# Patient Record
Sex: FEMALE | Race: WHITE | NOT HISPANIC OR LATINO | Employment: FULL TIME | URBAN - METROPOLITAN AREA
[De-identification: names, ages, dates, MRNs, and addresses within clinical notes are randomized per-mention and may not be internally consistent; named-entity substitution may affect disease eponyms.]

---

## 2019-09-02 ENCOUNTER — APPOINTMENT (OUTPATIENT)
Dept: CT IMAGING | Facility: CLINIC | Age: 54
End: 2019-09-02
Attending: EMERGENCY MEDICINE
Payer: COMMERCIAL

## 2019-09-02 ENCOUNTER — HOSPITAL ENCOUNTER (EMERGENCY)
Facility: CLINIC | Age: 54
Discharge: HOME | End: 2019-09-02
Attending: EMERGENCY MEDICINE | Admitting: EMERGENCY MEDICINE
Payer: COMMERCIAL

## 2019-09-02 VITALS
WEIGHT: 170 LBS | BODY MASS INDEX: 31.28 KG/M2 | HEIGHT: 62 IN | TEMPERATURE: 99.2 F | DIASTOLIC BLOOD PRESSURE: 94 MMHG | OXYGEN SATURATION: 98 % | SYSTOLIC BLOOD PRESSURE: 136 MMHG | HEART RATE: 75 BPM | RESPIRATION RATE: 20 BRPM

## 2019-09-02 DIAGNOSIS — R10.9 FLANK PAIN: ICD-10-CM

## 2019-09-02 DIAGNOSIS — T83.122A: ICD-10-CM

## 2019-09-02 LAB
ALBUMIN SERPL-MCNC: 3.4 G/DL (ref 3.4–5)
ALBUMIN UR-MCNC: 30 MG/DL
ALP SERPL-CCNC: 62 U/L (ref 40–150)
ALT SERPL W P-5'-P-CCNC: 19 U/L (ref 0–50)
ANION GAP SERPL CALCULATED.3IONS-SCNC: 7 MMOL/L (ref 3–14)
APPEARANCE UR: ABNORMAL
AST SERPL W P-5'-P-CCNC: 20 U/L (ref 0–45)
BASOPHILS # BLD AUTO: 0.1 10E9/L (ref 0–0.2)
BASOPHILS NFR BLD AUTO: 0.4 %
BILIRUB SERPL-MCNC: 0.6 MG/DL (ref 0.2–1.3)
BILIRUB UR QL STRIP: ABNORMAL
BUN SERPL-MCNC: 8 MG/DL (ref 7–30)
CALCIUM SERPL-MCNC: 8.2 MG/DL (ref 8.5–10.1)
CHLORIDE SERPL-SCNC: 106 MMOL/L (ref 94–109)
CO2 SERPL-SCNC: 24 MMOL/L (ref 20–32)
COLOR UR AUTO: ABNORMAL
CREAT SERPL-MCNC: 0.96 MG/DL (ref 0.52–1.04)
DIFFERENTIAL METHOD BLD: ABNORMAL
EOSINOPHIL # BLD AUTO: 0.2 10E9/L (ref 0–0.7)
EOSINOPHIL NFR BLD AUTO: 1.4 %
ERYTHROCYTE [DISTWIDTH] IN BLOOD BY AUTOMATED COUNT: 13.2 % (ref 10–15)
GFR SERPL CREATININE-BSD FRML MDRD: 66 ML/MIN/{1.73_M2}
GLUCOSE SERPL-MCNC: 105 MG/DL (ref 70–99)
GLUCOSE UR STRIP-MCNC: NEGATIVE MG/DL
HCT VFR BLD AUTO: 38.8 % (ref 35–47)
HGB BLD-MCNC: 14 G/DL (ref 11.7–15.7)
HGB UR QL STRIP: ABNORMAL
IMM GRANULOCYTES # BLD: 0 10E9/L (ref 0–0.4)
IMM GRANULOCYTES NFR BLD: 0.3 %
KETONES UR STRIP-MCNC: NEGATIVE MG/DL
LEUKOCYTE ESTERASE UR QL STRIP: ABNORMAL
LIPASE SERPL-CCNC: 79 U/L (ref 73–393)
LYMPHOCYTES # BLD AUTO: 1.3 10E9/L (ref 0.8–5.3)
LYMPHOCYTES NFR BLD AUTO: 11.2 %
MCH RBC QN AUTO: 34.2 PG (ref 26.5–33)
MCHC RBC AUTO-ENTMCNC: 36.1 G/DL (ref 31.5–36.5)
MCV RBC AUTO: 95 FL (ref 78–100)
MONOCYTES # BLD AUTO: 0.6 10E9/L (ref 0–1.3)
MONOCYTES NFR BLD AUTO: 5.3 %
NEUTROPHILS # BLD AUTO: 9.4 10E9/L (ref 1.6–8.3)
NEUTROPHILS NFR BLD AUTO: 81.4 %
NITRATE UR QL: POSITIVE
NRBC # BLD AUTO: 0 10*3/UL
NRBC BLD AUTO-RTO: 0 /100
PH UR STRIP: 6 PH (ref 5–7)
PLATELET # BLD AUTO: 345 10E9/L (ref 150–450)
POTASSIUM SERPL-SCNC: 3.9 MMOL/L (ref 3.4–5.3)
PROT SERPL-MCNC: 7.5 G/DL (ref 6.8–8.8)
RBC # BLD AUTO: 4.09 10E12/L (ref 3.8–5.2)
RBC #/AREA URNS AUTO: >182 /HPF (ref 0–2)
SODIUM SERPL-SCNC: 137 MMOL/L (ref 133–144)
SOURCE: ABNORMAL
SP GR UR STRIP: 1.02 (ref 1–1.03)
SQUAMOUS #/AREA URNS AUTO: 3 /HPF (ref 0–1)
UROBILINOGEN UR STRIP-MCNC: 4 MG/DL (ref 0–2)
WBC # BLD AUTO: 11.6 10E9/L (ref 4–11)
WBC #/AREA URNS AUTO: 8 /HPF (ref 0–5)

## 2019-09-02 PROCEDURE — 99285 EMERGENCY DEPT VISIT HI MDM: CPT | Mod: 25

## 2019-09-02 PROCEDURE — 87086 URINE CULTURE/COLONY COUNT: CPT | Performed by: EMERGENCY MEDICINE

## 2019-09-02 PROCEDURE — 80053 COMPREHEN METABOLIC PANEL: CPT | Performed by: EMERGENCY MEDICINE

## 2019-09-02 PROCEDURE — 96375 TX/PRO/DX INJ NEW DRUG ADDON: CPT

## 2019-09-02 PROCEDURE — 83690 ASSAY OF LIPASE: CPT | Performed by: EMERGENCY MEDICINE

## 2019-09-02 PROCEDURE — 25000128 H RX IP 250 OP 636: Performed by: EMERGENCY MEDICINE

## 2019-09-02 PROCEDURE — 74177 CT ABD & PELVIS W/CONTRAST: CPT

## 2019-09-02 PROCEDURE — 25000125 ZZHC RX 250: Performed by: EMERGENCY MEDICINE

## 2019-09-02 PROCEDURE — 85025 COMPLETE CBC W/AUTO DIFF WBC: CPT | Performed by: EMERGENCY MEDICINE

## 2019-09-02 PROCEDURE — 96374 THER/PROPH/DIAG INJ IV PUSH: CPT | Mod: 59

## 2019-09-02 PROCEDURE — 81001 URINALYSIS AUTO W/SCOPE: CPT | Performed by: EMERGENCY MEDICINE

## 2019-09-02 RX ORDER — KETOROLAC TROMETHAMINE 15 MG/ML
15 INJECTION, SOLUTION INTRAMUSCULAR; INTRAVENOUS ONCE
Status: COMPLETED | OUTPATIENT
Start: 2019-09-02 | End: 2019-09-02

## 2019-09-02 RX ORDER — IOPAMIDOL 755 MG/ML
85 INJECTION, SOLUTION INTRAVASCULAR ONCE
Status: COMPLETED | OUTPATIENT
Start: 2019-09-02 | End: 2019-09-02

## 2019-09-02 RX ORDER — ONDANSETRON 2 MG/ML
4 INJECTION INTRAMUSCULAR; INTRAVENOUS ONCE
Status: COMPLETED | OUTPATIENT
Start: 2019-09-02 | End: 2019-09-02

## 2019-09-02 RX ADMIN — ONDANSETRON 4 MG: 2 INJECTION INTRAMUSCULAR; INTRAVENOUS at 20:28

## 2019-09-02 RX ADMIN — SODIUM CHLORIDE 1000 ML: 9 INJECTION, SOLUTION INTRAVENOUS at 20:28

## 2019-09-02 RX ADMIN — SODIUM CHLORIDE 66 ML: 9 INJECTION, SOLUTION INTRAVENOUS at 21:15

## 2019-09-02 RX ADMIN — KETOROLAC TROMETHAMINE 15 MG: 15 INJECTION, SOLUTION INTRAMUSCULAR; INTRAVENOUS at 20:28

## 2019-09-02 RX ADMIN — IOPAMIDOL 85 ML: 755 INJECTION, SOLUTION INTRAVENOUS at 21:14

## 2019-09-02 ASSESSMENT — ENCOUNTER SYMPTOMS
NAUSEA: 1
FEVER: 1
FREQUENCY: 1
FLANK PAIN: 1
ABDOMINAL PAIN: 1
VOMITING: 0
FATIGUE: 1

## 2019-09-02 ASSESSMENT — MIFFLIN-ST. JEOR: SCORE: 1319.36

## 2019-09-02 NOTE — ED AVS SNAPSHOT
Emergency Department  [address] [place] [id number]  Phone:  [phone]  Fax:  [phone]                                    Elle Luu   MRN: [id number]    Department:   Emergency Department   Date of Visit:  9/2/2019           After Visit Summary Signature Page    I have received my discharge instructions, and my questions have been answered. I have discussed any challenges I see with this plan with the nurse or doctor.    ..........................................................................................................................................  Patient/Patient Representative Signature      ..........................................................................................................................................  Patient Representative Print Name and Relationship to Patient    ..................................................               ................................................  Date                                   Time    ..........................................................................................................................................  Reviewed by Signature/Title    ...................................................              ..............................................  Date                                               Time          22EPIC Rev 08/18

## 2019-09-03 LAB
BACTERIA SPEC CULT: NORMAL
Lab: NORMAL
SPECIMEN SOURCE: NORMAL

## 2019-09-03 NOTE — ED PROVIDER NOTES
"  History     Chief Complaint:  Abdominal and flank pain     HPI   Elle Luu is a 54 year old female who presents to the emergency department for evaluation of abdominal and flank pain. The patient reports the onset of pain in her left flank and abdomen today at 1000. She further reports increased urgency and frequency urinating for the past 5 days. She notes nausea, fatigue, and subjective fever associated with her other symptoms. She denies vomiting. Of note, the patient had a kidney stone removed and stent placed on 8/19; she indicates with this kidney stone she had pain in her right flank, but the stone was on the left.    Allergies:  Lisinopril      Medications:    Aspirin  Levothyroxine   Detrol  Toprol    Past Medical History:    Hypothyroidism   Kidney stone  HTN  Adenomatous polyp of colon    Past Surgical History:    Appendectomy  LEEP procedure   D & C   Subtalar arthroereisis  Colonoscopy     Family History:    Asthma  Diabetes  HTN  Thyroid disease     Social History:  Presents alone.  Never smoker.  Positive for alcohol use.    Marital Status:  Single [1]     Review of Systems   Constitutional: Positive for fatigue and fever (subjective).   Gastrointestinal: Positive for abdominal pain and nausea. Negative for vomiting.   Genitourinary: Positive for flank pain, frequency and urgency.   All other systems reviewed and are negative.    Physical Exam     Patient Vitals for the past 24 hrs:   BP Temp Temp src Pulse Resp SpO2 Height Weight   09/02/19 1948 (!) 168/83 99.2  F (37.3  C) Oral 81 20 98 % 1.575 m (5' 2\") 77.1 kg (170 lb)      Physical Exam  Eyes:  The pupils are equal and round    Conjunctivae and sclerae are normal  ENT:    The nose is normal    Pinnae are normal  CV:  Regular rate and rhythm     No edema  Resp:  Lungs are clear    Non-labored    No rales    No wheezing   GI:  Abdomen is soft with tenderness in the LUQ and left flank, there is no rigidity    No distension    No rebound " tenderness   MS:  Normal muscular tone    No asymmetric leg swelling  Skin:  No rash or acute skin lesions noted  Neuro:   Awake, alert.      Speech is normal and fluent.    Face is symmetric.     Moves all extremities    Emergency Department Course     Imaging:  Radiographic findings were communicated with the patient who voiced understanding of the findings.    CT Abdomen Pelvis w Contrast:  Hydronephrosis with a malpositioned/low positioned  ureteral stent on the left.   As per radiology.    Laboratory:  Lipase: 79    CBC: WBC: 11.6 (H), HGB: 14.0, PLT: 345    CMP: Glucose 105 (H), Calcium 8.2 (L), o/w WNL (Creatinine: 0.96)    UA with micro: Bilirubin moderate, blood large, Protein albumin 30, Urobilinogen 4.0 (H), Nitrite positive, Leukocyte esterase small, RBC >182 (H), WBC 8 (H), Squamous Epithelial/HPF 3 (H), o/w negative     Urine Culture Aerobic Bacterial: pending     Interventions:  2028 Toradol 15 mg IV   Zofran 4 mg IV   NS 1L IV BOLUS    Emergency Department Course:  Nursing notes and vitals reviewed. 2010 I performed an exam of the patient as documented above.     IV inserted. Medicine administered as documented above. Blood drawn. This was sent to the lab for further testing, results above.    The patient was sent for a CT Abdomen Pelvis while in the emergency department, findings above.     2143 I rechecked the patient and discussed the results of her workup thus far.     2000 I spoke with Dr. Carrero , urology, regarding the patient.     Findings and plan explained to the Patient. Patient discharged home with instructions regarding supportive care, medications, and reasons to return. The importance of close follow-up was reviewed.     I personally reviewed the laboratory results with the Patient and answered all related questions prior to discharge.    Impression & Plan      Medical Decision Making:  Elle Luu is a 54 year old female who presents the emergency department with pain  in her left abdomen and flank.  Symptoms worsened today.  Exam reveals tenderness in her left upper quadrant and flank.  She is had some nausea.  CT scan performed due to symptoms and shows migration of her left ureteral stent.  This is to be removed tomorrow.  White blood cell count was minimally elevated.  She did have 8 white blood cells in her urinalysis but greater than 182 red blood cells so likely inflammatory from the stent.  After Toradol here she feels improved and pain-free.  Discussed the patient with the on-call urologist.  Plan for follow-up in Dr. Kelly's clinic tomorrow for stent removal.  Return with any new or concerning symptoms.    Diagnosis:    ICD-10-CM   1. Flank pain R10.9   2. Migration of ureteral stent, initial encounter (H) T83.122A     Disposition:  discharged to home    Scribe Disclosure:  Remedios HYATT, am serving as a scribe on 9/2/2019 at 9:35 PM to personally document services performed by Pako Mcgregor MD based on my observations and the provider's statements to me.      Remedios Herrera  9/2/2019    EMERGENCY DEPARTMENT       Pako Mcgregor MD  09/02/19 8511

## 2019-09-04 NOTE — RESULT ENCOUNTER NOTE
Final urine culture report is NEGATIVE per [facility] Lab Result protocol.    If NEGATIVE result, no change in treatment, per [facility] Lab Result protocol.

## 2020-02-03 ENCOUNTER — HOSPITAL ENCOUNTER (EMERGENCY)
Facility: CLINIC | Age: 55
Discharge: HOME | End: 2020-02-03
Attending: EMERGENCY MEDICINE | Admitting: EMERGENCY MEDICINE
Payer: COMMERCIAL

## 2020-02-03 ENCOUNTER — OFFICE VISIT (OUTPATIENT)
Dept: URGENT CARE | Facility: URGENT CARE | Age: 55
End: 2020-02-03
Payer: COMMERCIAL

## 2020-02-03 VITALS
RESPIRATION RATE: 11 BRPM | HEART RATE: 56 BPM | TEMPERATURE: 98.1 F | OXYGEN SATURATION: 100 % | BODY MASS INDEX: 31.64 KG/M2 | DIASTOLIC BLOOD PRESSURE: 88 MMHG | SYSTOLIC BLOOD PRESSURE: 151 MMHG | HEIGHT: 62 IN

## 2020-02-03 VITALS
TEMPERATURE: 97.8 F | WEIGHT: 173 LBS | DIASTOLIC BLOOD PRESSURE: 90 MMHG | HEIGHT: 62 IN | HEART RATE: 60 BPM | SYSTOLIC BLOOD PRESSURE: 160 MMHG | RESPIRATION RATE: 16 BRPM | OXYGEN SATURATION: 99 % | BODY MASS INDEX: 31.83 KG/M2

## 2020-02-03 DIAGNOSIS — D72.829 LEUKOCYTOSIS, UNSPECIFIED TYPE: ICD-10-CM

## 2020-02-03 DIAGNOSIS — Z87.442 HISTORY OF KIDNEY STONES: ICD-10-CM

## 2020-02-03 DIAGNOSIS — M54.89 OTHER ACUTE BACK PAIN: Primary | ICD-10-CM

## 2020-02-03 DIAGNOSIS — R11.0 NAUSEA: ICD-10-CM

## 2020-02-03 DIAGNOSIS — R31.29 MICROSCOPIC HEMATURIA: ICD-10-CM

## 2020-02-03 DIAGNOSIS — M54.9 ACUTE BACK PAIN, UNSPECIFIED BACK LOCATION, UNSPECIFIED BACK PAIN LATERALITY: ICD-10-CM

## 2020-02-03 LAB
ALBUMIN SERPL-MCNC: 4.1 G/DL (ref 3.4–5)
ALBUMIN UR-MCNC: NEGATIVE MG/DL
ALP SERPL-CCNC: 67 U/L (ref 40–150)
ALT SERPL W P-5'-P-CCNC: 32 U/L (ref 0–50)
AMYLASE SERPL-CCNC: 47 U/L (ref 30–110)
ANION GAP SERPL CALCULATED.3IONS-SCNC: 3 MMOL/L (ref 3–14)
APPEARANCE UR: CLEAR
AST SERPL W P-5'-P-CCNC: 16 U/L (ref 0–45)
BACTERIA #/AREA URNS HPF: ABNORMAL /HPF
BASOPHILS # BLD AUTO: 0.1 10E9/L (ref 0–0.2)
BASOPHILS NFR BLD AUTO: 0.4 %
BILIRUB SERPL-MCNC: 0.4 MG/DL (ref 0.2–1.3)
BILIRUB UR QL STRIP: NEGATIVE
BUN SERPL-MCNC: 12 MG/DL (ref 7–30)
CALCIUM SERPL-MCNC: 9.1 MG/DL (ref 8.5–10.1)
CHLORIDE SERPL-SCNC: 107 MMOL/L (ref 94–109)
CO2 SERPL-SCNC: 28 MMOL/L (ref 20–32)
COLOR UR AUTO: YELLOW
CREAT SERPL-MCNC: 0.95 MG/DL (ref 0.52–1.04)
DIFFERENTIAL METHOD BLD: ABNORMAL
EOSINOPHIL # BLD AUTO: 0.1 10E9/L (ref 0–0.7)
EOSINOPHIL NFR BLD AUTO: 1 %
ERYTHROCYTE [DISTWIDTH] IN BLOOD BY AUTOMATED COUNT: 13 % (ref 10–15)
GFR SERPL CREATININE-BSD FRML MDRD: 67 ML/MIN/{1.73_M2}
GLUCOSE SERPL-MCNC: 99 MG/DL (ref 70–99)
GLUCOSE UR STRIP-MCNC: NEGATIVE MG/DL
HCT VFR BLD AUTO: 43 % (ref 35–47)
HGB BLD-MCNC: 15 G/DL (ref 11.7–15.7)
HGB UR QL STRIP: ABNORMAL
INTERPRETATION ECG - MUSE: NORMAL
KETONES UR STRIP-MCNC: NEGATIVE MG/DL
LEUKOCYTE ESTERASE UR QL STRIP: NEGATIVE
LIPASE SERPL-CCNC: 148 U/L (ref 73–393)
LYMPHOCYTES # BLD AUTO: 2.5 10E9/L (ref 0.8–5.3)
LYMPHOCYTES NFR BLD AUTO: 18.9 %
MCH RBC QN AUTO: 33.3 PG (ref 26.5–33)
MCHC RBC AUTO-ENTMCNC: 34.9 G/DL (ref 31.5–36.5)
MCV RBC AUTO: 96 FL (ref 78–100)
MONOCYTES # BLD AUTO: 0.9 10E9/L (ref 0–1.3)
MONOCYTES NFR BLD AUTO: 6.7 %
NEUTROPHILS # BLD AUTO: 9.8 10E9/L (ref 1.6–8.3)
NEUTROPHILS NFR BLD AUTO: 73 %
NITRATE UR QL: NEGATIVE
NON-SQ EPI CELLS #/AREA URNS LPF: ABNORMAL /LPF
PH UR STRIP: 6 PH (ref 5–7)
PLATELET # BLD AUTO: 350 10E9/L (ref 150–450)
POTASSIUM SERPL-SCNC: 3.9 MMOL/L (ref 3.4–5.3)
PROT SERPL-MCNC: 8.1 G/DL (ref 6.8–8.8)
RBC # BLD AUTO: 4.5 10E12/L (ref 3.8–5.2)
RBC #/AREA URNS AUTO: ABNORMAL /HPF
SODIUM SERPL-SCNC: 138 MMOL/L (ref 133–144)
SOURCE: ABNORMAL
SP GR UR STRIP: 1.01 (ref 1–1.03)
TROPONIN I SERPL-MCNC: <0.015 UG/L (ref 0–0.04)
TROPONIN I SERPL-MCNC: <0.015 UG/L (ref 0–0.04)
UROBILINOGEN UR STRIP-ACNC: 0.2 EU/DL (ref 0.2–1)
WBC # BLD AUTO: 13.4 10E9/L (ref 4–11)
WBC #/AREA URNS AUTO: ABNORMAL /HPF

## 2020-02-03 PROCEDURE — 99204 OFFICE O/P NEW MOD 45 MIN: CPT | Performed by: FAMILY MEDICINE

## 2020-02-03 PROCEDURE — 99284 EMERGENCY DEPT VISIT MOD MDM: CPT

## 2020-02-03 PROCEDURE — 83690 ASSAY OF LIPASE: CPT | Performed by: FAMILY MEDICINE

## 2020-02-03 PROCEDURE — 85025 COMPLETE CBC W/AUTO DIFF WBC: CPT | Performed by: FAMILY MEDICINE

## 2020-02-03 PROCEDURE — 81001 URINALYSIS AUTO W/SCOPE: CPT | Performed by: FAMILY MEDICINE

## 2020-02-03 PROCEDURE — 93005 ELECTROCARDIOGRAM TRACING: CPT

## 2020-02-03 PROCEDURE — 93000 ELECTROCARDIOGRAM COMPLETE: CPT | Performed by: FAMILY MEDICINE

## 2020-02-03 PROCEDURE — 84484 ASSAY OF TROPONIN QUANT: CPT | Performed by: EMERGENCY MEDICINE

## 2020-02-03 PROCEDURE — 82150 ASSAY OF AMYLASE: CPT | Performed by: FAMILY MEDICINE

## 2020-02-03 PROCEDURE — 80053 COMPREHEN METABOLIC PANEL: CPT | Performed by: FAMILY MEDICINE

## 2020-02-03 PROCEDURE — 36415 COLL VENOUS BLD VENIPUNCTURE: CPT | Performed by: FAMILY MEDICINE

## 2020-02-03 PROCEDURE — 83690 ASSAY OF LIPASE: CPT | Performed by: EMERGENCY MEDICINE

## 2020-02-03 PROCEDURE — 84484 ASSAY OF TROPONIN QUANT: CPT | Performed by: FAMILY MEDICINE

## 2020-02-03 RX ORDER — POTASSIUM CHLORIDE 1500 MG/1
40 TABLET, EXTENDED RELEASE ORAL
COMMUNITY
End: 2021-05-06

## 2020-02-03 RX ORDER — METOPROLOL TARTRATE 100 MG
100 TABLET ORAL 2 TIMES DAILY
COMMUNITY

## 2020-02-03 ASSESSMENT — ENCOUNTER SYMPTOMS
FEVER: 0
VOMITING: 0
ABDOMINAL PAIN: 0
DYSURIA: 0
FLANK PAIN: 1
HEMATURIA: 0
NAUSEA: 1

## 2020-02-03 ASSESSMENT — MIFFLIN-ST. JEOR: SCORE: 1332.97

## 2020-02-03 NOTE — ED AVS SNAPSHOT
Emergency Department  [address] [facility] [address]  Phone:  [phone]  Fax:  [phone]                                    Elle Luu   MRN: [id number]    Department:   Emergency Department   Date of Visit:  2/3/2020           After Visit Summary Signature Page    I have received my discharge instructions, and my questions have been answered. I have discussed any challenges I see with this plan with the nurse or doctor.    ..........................................................................................................................................  Patient/Patient Representative Signature      ..........................................................................................................................................  Patient Representative Print Name and Relationship to Patient    ..................................................               ................................................  Date                                   Time    ..........................................................................................................................................  Reviewed by Signature/Title    ...................................................              ..............................................  Date                                               Time          22EPIC Rev 08/18

## 2020-02-04 LAB — LIPASE SERPL-CCNC: 153 U/L (ref 73–393)

## 2020-02-04 NOTE — PROGRESS NOTES
"SUBJECTIVE: Elle Luu is a 54 year old female presenting with a chief complaint of bilateral mid back pain into chest with nausea.  Onset of symptoms was 4 pm.  Course of illness is same.    Severity moderate  Current and Associated symptoms: none  Treatment measures tried include None tried.  Predisposing factors include None.    Past Medical History:   Diagnosis Date     Hypothyroidism        Past Surgical History:   Procedure Laterality Date     APPENDECTOMY  1985     FOOT SURGERY  1984       Family History   Problem Relation Age of Onset     Asthma Daughter      Diabetes Mother      Hypertension Mother      Thyroid Disease Mother      Heart Disease Father      Other - See Comments Father         bleeding problem       Social History     Tobacco Use     Smoking status: Never Smoker     Smokeless tobacco: Never Used   Substance Use Topics     Alcohol use: Yes        Allergies   Allergen Reactions     Lisinopril Cough     No Known Drug Allergy      Review Of Systems  Skin: negative  Eyes: negative  Ears/Nose/Throat: negative  Respiratory: No shortness of breath, dyspnea on exertion, cough, or hemoptysis  Cardiovascular: heartburn  Gastrointestinal: negative  Genitourinary: negative  Musculoskeletal: as above  Neurologic: negative  Psychiatric: negative    OBJECTIVE:  BP (!) 160/90   Pulse 60   Temp 97.8  F (36.6  C) (Oral)   Resp 16   Ht 1.575 m (5' 2\")   Wt 78.5 kg (173 lb)   SpO2 99%   BMI 31.64 kg/m     GENERAL APPEARANCE: healthy, alert and no distress  EYES: EOMI,  PERRL, conjunctiva clear  HENT: ear canals and TM's normal.  Nose and mouth without ulcers, erythema or lesions  NECK: supple, no lymphadenopathy  RESP: lungs clear to auscultation - no rales, rhonchi or wheezes  CV: regular rates and rhythm, normal S1 S2, no murmur noted  ABDOMEN:  soft, nontender, no HSM or masses and bowel sounds normal  NEURO: Normal strength normal speech and mentation  SKIN: no suspicious lesions or " rashes    EKG NSR      ICD-10-CM    1. Other acute back pain M54.9 CBC with platelets differential     Comprehensive metabolic panel     UA with Microscopic reflex to Culture     Amylase     Lipase     Troponin I     EKG 12-lead complete w/read - Clinics   2. Nausea R11.0 EKG 12-lead complete w/read - Clinics   3. Leukocytosis, unspecified type D72.829    4. Microscopic hematuria R31.29    5. History of kidney stones Z87.442      No MI by normal EKG/troponin  Pt will go through ED for cont w/u

## 2020-02-04 NOTE — ED PROVIDER NOTES
History   Chief Complaint:  Back Pain    HPI   Elle Luu is a 54 year old female, with a history of kidney stones and hypertension, who presents to the ED for evaluation of bilateral mid-back pain. The patient reports having a fairly sudden onset of bilateral back pain beginning at 1600 this evening. She states she was driving her child to an appointment when she had this onset. During the car ride she felt nauseous, but did not have an emesis episode. After she returned home, the pain went away without any interventions. She states after this event she called her friend which prompted her to go into urgent care for her symptoms. In urgent care they ran blood work, but told her to go to the emergency department for further evaluation. Here in the emergency department, she has no fever, flank pain, dysuria, hematuria, or any other acute symptoms. She denies any abdominal or chest pain. She denies any significant cardiac history.   She cites a specific concern that her symptoms may represent a heart attack, while specifically denying ever having any chest or abdominal symptoms.  No lateralizing symptoms.  No recent urinary symptoms.  She states this felt much different than prior kidney stones.  She denies any history of pyelonephritis.  She does not wish to have any treatments here.    Laboratory-2/3/2020  CBC: WBC 13.4 (H), o/w WNL (HGB 15.0, )  CMP: WNL (Creatinine 0.95)  Troponin (1854): <0.015  Amylase: 47  UA: Blood Small, RBC 2-5 (H), Bacteria Few, o/w Negative    Allergies:  Lisinopril    Medications:    Aspirin  Levothyroxine  Lopressor  Potasium    Past Medical History:    Hypertension  Hypothyroidism    Past Surgical History:    Appendectomy  Foot surgery    Family History:    Asthma  Diabetes  Hypertension  Thyroid disease  Heart disease    Social History:  Smoking status: Never  Alcohol use: Never  Drug use: Yes  PCP: [facility]  Presents to the ED with daughter       Review of  "Systems   Constitutional: Negative for fever.   Cardiovascular: Negative for chest pain.   Gastrointestinal: Positive for nausea. Negative for abdominal pain and vomiting.   Genitourinary: Positive for flank pain. Negative for dysuria and hematuria.   All other systems reviewed and are negative.    Physical Exam     Patient Vitals for the past 24 hrs:   BP Temp Temp src Pulse Heart Rate Resp SpO2 Height   02/03/20 2212 -- -- -- 56 56 11 100 % --   02/03/20 2048 (!) 151/88 98.1  F (36.7  C) Oral 62 62 16 98 % 1.575 m (5' 2\")     Physical Exam  General: Nontoxic-appearing woman sitting upright in room 13, daughter at bedside  HENT: mucous membranes moist  CV: regular rate, regular rhythm, normal bilateral radial and foot pulses  Resp: clear throughout, normal effort, no crackles or wheezing  GI: abdomen soft and nontender, no guarding, no palpable masses  MSK:   Thoracic spine: nontender, no CVAT  Lumbar spine: nontender  Pelvis stable.  : deferred  Skin: appropriately warm and dry, no erythema/ecchymosis/vesicles to back  Neuro: alert, clear speech, oriented, ambulatory  Psych: Very pleasant, cooperative      Emergency Department Course   ECG (21:34:00):  Rate 60 bpm. DC interval 148. QRS duration 82. QT/QTc 462/462. P-R-T axes 37 -20 24. Normal sinus rhythm. Normal ECG. Interpreted at 2137 by Estuardo Lockhart MD.    Laboratory:  Laboratory findings were communicated with the patient who voiced understanding of the findings.    Troponin: <0.015    Lipase: 148    Emergency Department Course:  Past medical records, nursing notes, and vitals reviewed.    2113 I performed an exam of the patient as documented above.     IV was inserted and blood was drawn for laboratory testing, results above.    I performed electronic chart review in [employer].  The patient was placed on continuous cardiac and pulse ox monitoring.    2223 I rechecked the patient and discussed the results of her workup thus far.     Findings and plan " explained to the Patient. Patient discharged home with instructions regarding supportive care, medications, and reasons to return. The importance of close follow-up was reviewed.    I personally reviewed the laboratory results with the Patient and answered all related questions prior to discharge.     Impression & Plan   Medical Decision Making:  The cause of her acute and seemingly spontaneous back pain that has also completely resolved remains unconfirmed.  This may have been some muscle spasm or other musculoskeletal cause.  Consideration was given to renal colic, though this would be very unusual to develop abruptly bilaterally.  Time course also atypical for infection, and her urinalysis is only minimally abnormal with some squamous cells as well.  In the absence of any urinary symptoms or ongoing symptoms whatsoever, I do not think that antibiotics are indicated.  We discussed potential risks and benefits of CT imaging which she preferred to defer.  Also considered, but do not suspect, epidural abscess, hematoma, osteomyelitis, neurologic process, aortic pathology, perforated viscus, atypical presentation of PE, pneumothorax, or many others.  Her primary concern in seeking evaluation today was the possibility of an acute coronary syndrome, and while this was acknowledged to her as a possibility, my clinical suspicion is very low.  This was further lowered with a repeat troponin that is negative as well as an EKG showing no worrisome acute findings.  She is asymptomatic.  She agreed with the plan for discharge home with close outpatient follow-up, returning here in the unexpected event of acute worsening.    Diagnosis:    ICD-10-CM    1. Acute back pain, unspecified back location, unspecified back pain laterality M54.9      Disposition:  Discharged to home.    Scribe Disclosure:  EDMAR, Kei Carpenter, am serving as a scribe at 9:13 PM on 2/3/2020 to document services personally performed by Estuardo Lockhart,  MD based on my observations and the provider's statements to me.     This record was created at least in part using electronic voice recognition software, so please excuse any typographical errors.       Estuardo Lockhart MD  02/04/20 3847

## 2020-04-23 ENCOUNTER — APPOINTMENT (OUTPATIENT)
Dept: CT IMAGING | Facility: CLINIC | Age: 55
End: 2020-04-23
Attending: EMERGENCY MEDICINE
Payer: COMMERCIAL

## 2020-04-23 ENCOUNTER — HOSPITAL ENCOUNTER (EMERGENCY)
Facility: CLINIC | Age: 55
Discharge: HOME | End: 2020-04-23
Attending: EMERGENCY MEDICINE | Admitting: EMERGENCY MEDICINE
Payer: COMMERCIAL

## 2020-04-23 VITALS
HEIGHT: 62 IN | OXYGEN SATURATION: 97 % | RESPIRATION RATE: 16 BRPM | SYSTOLIC BLOOD PRESSURE: 159 MMHG | TEMPERATURE: 97.9 F | DIASTOLIC BLOOD PRESSURE: 72 MMHG | BODY MASS INDEX: 31.83 KG/M2 | HEART RATE: 80 BPM | WEIGHT: 173 LBS

## 2020-04-23 DIAGNOSIS — N13.5 URETERAL STRICTURE, LEFT: ICD-10-CM

## 2020-04-23 DIAGNOSIS — N13.4 HYDROURETER: ICD-10-CM

## 2020-04-23 DIAGNOSIS — R10.9 FLANK PAIN: ICD-10-CM

## 2020-04-23 LAB
ALBUMIN UR-MCNC: 10 MG/DL
ANION GAP SERPL CALCULATED.3IONS-SCNC: 7 MMOL/L (ref 3–14)
APPEARANCE UR: ABNORMAL
BASOPHILS # BLD AUTO: 0 10E9/L (ref 0–0.2)
BASOPHILS NFR BLD AUTO: 0.3 %
BILIRUB UR QL STRIP: NEGATIVE
BUN SERPL-MCNC: 11 MG/DL (ref 7–30)
CALCIUM SERPL-MCNC: 9 MG/DL (ref 8.5–10.1)
CAOX CRY #/AREA URNS HPF: ABNORMAL /HPF
CHLORIDE SERPL-SCNC: 108 MMOL/L (ref 94–109)
CO2 SERPL-SCNC: 23 MMOL/L (ref 20–32)
COLOR UR AUTO: YELLOW
CREAT SERPL-MCNC: 0.89 MG/DL (ref 0.52–1.04)
DIFFERENTIAL METHOD BLD: ABNORMAL
EOSINOPHIL # BLD AUTO: 0.1 10E9/L (ref 0–0.7)
EOSINOPHIL NFR BLD AUTO: 0.7 %
ERYTHROCYTE [DISTWIDTH] IN BLOOD BY AUTOMATED COUNT: 12.9 % (ref 10–15)
GFR SERPL CREATININE-BSD FRML MDRD: 73 ML/MIN/{1.73_M2}
GLUCOSE SERPL-MCNC: 131 MG/DL (ref 70–99)
GLUCOSE UR STRIP-MCNC: NEGATIVE MG/DL
HCT VFR BLD AUTO: 40.8 % (ref 35–47)
HGB BLD-MCNC: 14.4 G/DL (ref 11.7–15.7)
HGB UR QL STRIP: ABNORMAL
IMM GRANULOCYTES # BLD: 0 10E9/L (ref 0–0.4)
IMM GRANULOCYTES NFR BLD: 0.2 %
KETONES UR STRIP-MCNC: 5 MG/DL
LEUKOCYTE ESTERASE UR QL STRIP: ABNORMAL
LYMPHOCYTES # BLD AUTO: 1.7 10E9/L (ref 0.8–5.3)
LYMPHOCYTES NFR BLD AUTO: 14 %
MCH RBC QN AUTO: 33.6 PG (ref 26.5–33)
MCHC RBC AUTO-ENTMCNC: 35.3 G/DL (ref 31.5–36.5)
MCV RBC AUTO: 95 FL (ref 78–100)
MONOCYTES # BLD AUTO: 0.6 10E9/L (ref 0–1.3)
MONOCYTES NFR BLD AUTO: 5 %
MUCOUS THREADS #/AREA URNS LPF: PRESENT /LPF
NEUTROPHILS # BLD AUTO: 9.7 10E9/L (ref 1.6–8.3)
NEUTROPHILS NFR BLD AUTO: 79.8 %
NITRATE UR QL: NEGATIVE
NRBC # BLD AUTO: 0 10*3/UL
NRBC BLD AUTO-RTO: 0 /100
PH UR STRIP: 5.5 PH (ref 5–7)
PLATELET # BLD AUTO: 312 10E9/L (ref 150–450)
POTASSIUM SERPL-SCNC: 3.8 MMOL/L (ref 3.4–5.3)
RBC # BLD AUTO: 4.28 10E12/L (ref 3.8–5.2)
RBC #/AREA URNS AUTO: 11 /HPF (ref 0–2)
SODIUM SERPL-SCNC: 138 MMOL/L (ref 133–144)
SOURCE: ABNORMAL
SP GR UR STRIP: 1.02 (ref 1–1.03)
SQUAMOUS #/AREA URNS AUTO: 6 /HPF (ref 0–1)
UROBILINOGEN UR STRIP-MCNC: 2 MG/DL (ref 0–2)
WBC # BLD AUTO: 12.2 10E9/L (ref 4–11)
WBC #/AREA URNS AUTO: 10 /HPF (ref 0–5)

## 2020-04-23 PROCEDURE — 81001 URINALYSIS AUTO W/SCOPE: CPT | Performed by: EMERGENCY MEDICINE

## 2020-04-23 PROCEDURE — 99284 EMERGENCY DEPT VISIT MOD MDM: CPT | Mod: 25

## 2020-04-23 PROCEDURE — 25000132 ZZH RX MED GY IP 250 OP 250 PS 637

## 2020-04-23 PROCEDURE — 74176 CT ABD & PELVIS W/O CONTRAST: CPT

## 2020-04-23 PROCEDURE — 80048 BASIC METABOLIC PNL TOTAL CA: CPT | Performed by: EMERGENCY MEDICINE

## 2020-04-23 PROCEDURE — 85025 COMPLETE CBC W/AUTO DIFF WBC: CPT | Performed by: EMERGENCY MEDICINE

## 2020-04-23 PROCEDURE — 25000128 H RX IP 250 OP 636: Performed by: EMERGENCY MEDICINE

## 2020-04-23 RX ORDER — HYDROCODONE BITARTRATE AND ACETAMINOPHEN 5; 325 MG/1; MG/1
1 TABLET ORAL ONCE
Status: COMPLETED | OUTPATIENT
Start: 2020-04-23 | End: 2020-04-23

## 2020-04-23 RX ORDER — ONDANSETRON 4 MG/1
4 TABLET, ORALLY DISINTEGRATING ORAL EVERY 8 HOURS PRN
Qty: 10 TABLET | Refills: 0 | Status: ON HOLD | OUTPATIENT
Start: 2020-04-23 | End: 2020-05-04

## 2020-04-23 RX ORDER — IBUPROFEN 600 MG/1
600 TABLET, FILM COATED ORAL ONCE
Status: COMPLETED | OUTPATIENT
Start: 2020-04-23 | End: 2020-04-23

## 2020-04-23 RX ORDER — ONDANSETRON 4 MG/1
4 TABLET, ORALLY DISINTEGRATING ORAL ONCE
Status: COMPLETED | OUTPATIENT
Start: 2020-04-23 | End: 2020-04-23

## 2020-04-23 RX ORDER — HYDROCODONE BITARTRATE AND ACETAMINOPHEN 5; 325 MG/1; MG/1
TABLET ORAL
Status: COMPLETED
Start: 2020-04-23 | End: 2020-04-23

## 2020-04-23 RX ORDER — CEPHALEXIN 500 MG/1
500 CAPSULE ORAL 3 TIMES DAILY
Qty: 30 CAPSULE | Refills: 0 | Status: SHIPPED | OUTPATIENT
Start: 2020-04-23 | End: 2020-05-03

## 2020-04-23 RX ORDER — IBUPROFEN 600 MG/1
TABLET, FILM COATED ORAL
Status: COMPLETED
Start: 2020-04-23 | End: 2020-04-23

## 2020-04-23 RX ORDER — HYDROCODONE BITARTRATE AND ACETAMINOPHEN 5; 325 MG/1; MG/1
1 TABLET ORAL EVERY 6 HOURS PRN
Qty: 10 TABLET | Refills: 0 | Status: ON HOLD | OUTPATIENT
Start: 2020-04-23 | End: 2020-05-04

## 2020-04-23 RX ADMIN — IBUPROFEN 600 MG: 600 TABLET ORAL at 01:09

## 2020-04-23 RX ADMIN — IBUPROFEN 600 MG: 600 TABLET, FILM COATED ORAL at 01:09

## 2020-04-23 RX ADMIN — HYDROCODONE BITARTRATE AND ACETAMINOPHEN 1 TABLET: 5; 325 TABLET ORAL at 01:10

## 2020-04-23 RX ADMIN — ONDANSETRON 4 MG: 4 TABLET, ORALLY DISINTEGRATING ORAL at 01:36

## 2020-04-23 ASSESSMENT — ENCOUNTER SYMPTOMS: BACK PAIN: 1

## 2020-04-23 ASSESSMENT — MIFFLIN-ST. JEOR: SCORE: 1327.97

## 2020-04-23 NOTE — ED PROVIDER NOTES
"  History     Chief Complaint:  Back Pain    HPI   Elle Luu is a 55 year old female who presents with back pain. The patient reports that 30 minutes ago he started to develop mid back pain. The patient has not taken anything for pain.  She states her pain is similar to pain she had and was seen for here in this ER earlier this month.  She had a follow-up appointment in the interim, and had no pain for several days, and thought that she had moved past this, but then again tonight suddenly the pain started in the left posterior side of her back.  No fevers, no nausea or vomiting, no dysuria.  No trauma, patient states she otherwise felt well until the pain came on rather sharply.    Allergies:  Lisinopril      Medications:    Aspirin  Synthroid/Levothroid   Lopressor  K-tab     Past Medical History:    Hypertension  hypothyroidism     Past Surgical History:    Appendectomy   Foot surgery     Family History:    Daughter: asthma  Mother: diabetes, hypertension, thyroid disease  Father: heart disease     Social History:  The patient was unaccompanied to the ED.  Smoking Status: Never Smoker  Smokeless Tobacco: Never Used  Alcohol Use: Positive  Drug Use: Negative  PCP: [facility]   Marital Status:  Single      Review of Systems   Musculoskeletal: Positive for back pain.   All other systems reviewed and are negative.    Physical Exam     Patient Vitals for the past 24 hrs:   BP Temp Temp src Pulse Resp SpO2 Height Weight   04/23/20 0358 (!) 159/72 -- -- -- -- -- -- --   04/23/20 0245 -- -- -- -- 16 -- -- --   04/23/20 0042 (!) 175/96 97.9  F (36.6  C) Oral 80 14 97 % 1.575 m (5' 2\") 78.5 kg (173 lb)      Physical Exam  Vitals: reviewed by me  General: Pt seen on [facility], pleasant, cooperative, and alert to conversation  Eyes: Tracking well, clear conjunctiva BL  ENT: MMM, midline trachea.   Lungs:   No tachypnea, no accessory muscle use. No respiratory distress.   CV: Rate as above, regular " rhythm.    Abd: Soft, non tender, no guarding, no rebound. Non distended  MSK: no peripheral edema or joint effusion.  No evidence of trauma  No CTL or S spine tenderness to palpation, does have some left-sided CVA tenderness.  Skin: No rash, normal turgor and temperature  Neuro: Clear speech and no facial droop.  Psych: Not RIS, no e/o AH/VH      Emergency Department Course     Imaging:  Radiology findings were communicated with the patient who voiced understanding of the findings.    Abd/pelvis CT no contrast - Stone Protocol  1.  Moderate to marked dilatation of the left intrarenal collecting system and proximal and mid left ureter with an abrupt transition to a nondilated distal left ureter, with no visualized left ureteral calculus. Additionally, there is mild left   perirenal and periureteral stranding. These findings are consistent with a distal left ureteral obstruction that could be due to a stricture or a ureteral calculus that is too small to visualize on this study. An obstructing transitional cell carcinoma   cannot be excluded.  2.  No other acute abnormality identified in the abdomen or pelvis.  3.  Tiny nonobstructing left renal calculus.  Reading per radiology      Laboratory:  Laboratory findings were communicated with the patient who voiced understanding of the findings.    UA: Urineketon 5 (A), Blood moderate (A), Protein Albumin 10 (A), Leukocyte Esterase small (A), RBC 11 (H), WBC 6 (H), Squamous Epithelial 6 (H), Mucous present (A), Calcium Oxalate many (A), o/w WNL.     CBC: WBC 12.2 (H), HGB 14.4,   BMP: Glucose 131 (H), o/w WNL (Creatinine 0.89)    Interventions:  0109 ibuprofen 600 mg PO  0110 Norco 325 mg PO  0136 Zofran 4 mg PO    Emergency Department Course:     Nursing notes and vitals reviewed. I performed an exam of the patient as documented above.     0152 The patient was sent for a CT while in the emergency department, results above.      0213 The patient provided a urine  sample here in the emergency department. This was sent for laboratory testing, findings above.     0242 IV was inserted and blood was drawn for laboratory testing, results above.     0335 Prior to discharge, I personally reviewed the results with the patient and all related questions were answered. The patient verbalized understanding and is amenable to plan.     Impression & Plan      Medical Decision Making:  Elle Luu is a 55 year old female who presents to the emergency department today for evaluation of left sided flank pain. This is likely due to a ureteral stricture. I think that she had a stent in this side that may have caused some scar tissue based on the CT scan. She has no fever here, no white count, but does have some significant pain that I do think is likely related to possibly stricture.  Her CT scan also shows some fat stranding, and for this reason of given Keflex antibiotics Out of an abundance of caution. There is no kidney stone that I can see. She has a benign abdomen, does not appear septic. She knows to come back immediately with any fevers. The patient knows to follow with her urologist tomorrow. The patient is ok with this plan and we will plan for close outpatient follow up after discharge.     Diagnosis:    ICD-10-CM    1. Ureteral stricture, left  N13.5    2. Hydroureter  N13.4    3. Flank pain  R10.9      Disposition:   The patient is discharged to home.     Discharge Medications:  Discharge Medication List as of 4/23/2020  3:43 AM      START taking these medications    Details   cephALEXin (KEFLEX) 500 MG capsule Take 1 capsule (500 mg) by mouth 3 times daily for 10 days, Disp-30 capsule,R-0, Local Print      HYDROcodone-acetaminophen (NORCO) 5-325 MG tablet Take 1 tablet by mouth every 6 hours as needed, Disp-10 tablet,R-0, Local Print      ondansetron (ZOFRAN ODT) 4 MG ODT tab Take 1 tablet (4 mg) by mouth every 8 hours as needed, Disp-10 tablet,R-0, Local Print            Scribe Disclosure:  I, Orla Severson, am serving as a scribe at 1:02 AM on 4/23/2020 to document services personally performed by Damián Pena MD based on my observations and the provider's statements to me.    EMERGENCY DEPARTMENT       Damián Pena MD  04/23/20 0510

## 2020-04-23 NOTE — ED AVS SNAPSHOT
Emergency Department  [address] [facility] [address]  Phone:  [phone]  Fax:  [phone]                                    Elle Luu   MRN: [id number]    Department:   Emergency Department   Date of Visit:  4/23/2020           After Visit Summary Signature Page    I have received my discharge instructions, and my questions have been answered. I have discussed any challenges I see with this plan with the nurse or doctor.    ..........................................................................................................................................  Patient/Patient Representative Signature      ..........................................................................................................................................  Patient Representative Print Name and Relationship to Patient    ..................................................               ................................................  Date                                   Time    ..........................................................................................................................................  Reviewed by Signature/Title    ...................................................              ..............................................  Date                                               Time          22EPIC Rev 08/18

## 2020-04-23 NOTE — DISCHARGE INSTRUCTIONS
Please do call either your own urologist, or the urologist whose information as above, as these of the urologists that are on-call.  Please come back to the ER with any worsening pain, or any fever, or with any other concerns.

## 2020-04-29 DIAGNOSIS — Z20.822 ENCOUNTER FOR LABORATORY TESTING FOR COVID-19 VIRUS: Primary | ICD-10-CM

## 2020-05-03 ENCOUNTER — ANESTHESIA EVENT (OUTPATIENT)
Dept: SURGERY | Facility: CLINIC | Age: 55
End: 2020-05-03
Payer: COMMERCIAL

## 2020-05-04 ENCOUNTER — HOSPITAL ENCOUNTER (OUTPATIENT)
Facility: CLINIC | Age: 55
Discharge: HOME | End: 2020-05-04
Attending: UROLOGY | Admitting: UROLOGY
Payer: COMMERCIAL

## 2020-05-04 ENCOUNTER — ANESTHESIA (OUTPATIENT)
Dept: SURGERY | Facility: CLINIC | Age: 55
End: 2020-05-04
Payer: COMMERCIAL

## 2020-05-04 ENCOUNTER — APPOINTMENT (OUTPATIENT)
Dept: GENERAL RADIOLOGY | Facility: CLINIC | Age: 55
End: 2020-05-04
Attending: UROLOGY
Payer: COMMERCIAL

## 2020-05-04 VITALS
HEART RATE: 62 BPM | DIASTOLIC BLOOD PRESSURE: 76 MMHG | WEIGHT: 170.3 LBS | TEMPERATURE: 96.3 F | HEIGHT: 62 IN | SYSTOLIC BLOOD PRESSURE: 137 MMHG | RESPIRATION RATE: 30 BRPM | OXYGEN SATURATION: 100 % | BODY MASS INDEX: 31.34 KG/M2

## 2020-05-04 DIAGNOSIS — N13.5 URETERAL STRICTURE, LEFT: Primary | ICD-10-CM

## 2020-05-04 PROCEDURE — C1769 GUIDE WIRE: HCPCS | Performed by: UROLOGY

## 2020-05-04 PROCEDURE — C1726 CATH, BAL DIL, NON-VASCULAR: HCPCS | Performed by: UROLOGY

## 2020-05-04 PROCEDURE — 25000125 ZZHC RX 250: Performed by: NURSE ANESTHETIST, CERTIFIED REGISTERED

## 2020-05-04 PROCEDURE — 36000052 ZZH SURGERY LEVEL 2 EA 15 ADDTL MIN: Performed by: UROLOGY

## 2020-05-04 PROCEDURE — 37000008 ZZH ANESTHESIA TECHNICAL FEE, 1ST 30 MIN: Performed by: UROLOGY

## 2020-05-04 PROCEDURE — 25000132 ZZH RX MED GY IP 250 OP 250 PS 637: Performed by: UROLOGY

## 2020-05-04 PROCEDURE — 25800030 ZZH RX IP 258 OP 636: Performed by: NURSE ANESTHETIST, CERTIFIED REGISTERED

## 2020-05-04 PROCEDURE — 25500064 ZZH RX 255 OP 636: Performed by: UROLOGY

## 2020-05-04 PROCEDURE — 25000125 ZZHC RX 250: Performed by: UROLOGY

## 2020-05-04 PROCEDURE — 25000128 H RX IP 250 OP 636: Performed by: NURSE ANESTHETIST, CERTIFIED REGISTERED

## 2020-05-04 PROCEDURE — 37000009 ZZH ANESTHESIA TECHNICAL FEE, EACH ADDTL 15 MIN: Performed by: UROLOGY

## 2020-05-04 PROCEDURE — 36000050 ZZH SURGERY LEVEL 2 1ST 30 MIN: Performed by: UROLOGY

## 2020-05-04 PROCEDURE — 71000012 ZZH RECOVERY PHASE 1 LEVEL 1 FIRST HR: Performed by: UROLOGY

## 2020-05-04 PROCEDURE — 25000566 ZZH SEVOFLURANE, EA 15 MIN: Performed by: UROLOGY

## 2020-05-04 PROCEDURE — C1758 CATHETER, URETERAL: HCPCS | Performed by: UROLOGY

## 2020-05-04 PROCEDURE — 71000027 ZZH RECOVERY PHASE 2 EACH 15 MINS: Performed by: UROLOGY

## 2020-05-04 PROCEDURE — 27210794 ZZH OR GENERAL SUPPLY STERILE: Performed by: UROLOGY

## 2020-05-04 PROCEDURE — 25000128 H RX IP 250 OP 636: Performed by: PHYSICIAN ASSISTANT

## 2020-05-04 PROCEDURE — 40000170 ZZH STATISTIC PRE-PROCEDURE ASSESSMENT II: Performed by: UROLOGY

## 2020-05-04 PROCEDURE — 40000277 XR SURGERY CARM FLUORO LESS THAN 5 MIN W STILLS: Mod: TC

## 2020-05-04 RX ORDER — ONDANSETRON 2 MG/ML
INJECTION INTRAMUSCULAR; INTRAVENOUS PRN
Status: DISCONTINUED | OUTPATIENT
Start: 2020-05-04 | End: 2020-05-04

## 2020-05-04 RX ORDER — PROPOFOL 10 MG/ML
INJECTION, EMULSION INTRAVENOUS PRN
Status: DISCONTINUED | OUTPATIENT
Start: 2020-05-04 | End: 2020-05-04

## 2020-05-04 RX ORDER — SODIUM CHLORIDE, SODIUM LACTATE, POTASSIUM CHLORIDE, CALCIUM CHLORIDE 600; 310; 30; 20 MG/100ML; MG/100ML; MG/100ML; MG/100ML
INJECTION, SOLUTION INTRAVENOUS CONTINUOUS
Status: DISCONTINUED | OUTPATIENT
Start: 2020-05-04 | End: 2020-05-04 | Stop reason: HOSPADM

## 2020-05-04 RX ORDER — HYDROMORPHONE HYDROCHLORIDE 1 MG/ML
.3-.5 INJECTION, SOLUTION INTRAMUSCULAR; INTRAVENOUS; SUBCUTANEOUS EVERY 10 MIN PRN
Status: DISCONTINUED | OUTPATIENT
Start: 2020-05-04 | End: 2020-05-04 | Stop reason: HOSPADM

## 2020-05-04 RX ORDER — FENTANYL CITRATE 50 UG/ML
25-50 INJECTION, SOLUTION INTRAMUSCULAR; INTRAVENOUS
Status: DISCONTINUED | OUTPATIENT
Start: 2020-05-04 | End: 2020-05-04 | Stop reason: HOSPADM

## 2020-05-04 RX ORDER — OXYCODONE HYDROCHLORIDE 5 MG/1
5 TABLET ORAL EVERY 4 HOURS PRN
Status: DISCONTINUED | OUTPATIENT
Start: 2020-05-04 | End: 2020-05-04 | Stop reason: HOSPADM

## 2020-05-04 RX ORDER — LIDOCAINE HYDROCHLORIDE 20 MG/ML
INJECTION, SOLUTION INFILTRATION; PERINEURAL PRN
Status: DISCONTINUED | OUTPATIENT
Start: 2020-05-04 | End: 2020-05-04

## 2020-05-04 RX ORDER — OXYCODONE HYDROCHLORIDE 5 MG/1
5-10 TABLET ORAL EVERY 4 HOURS PRN
Qty: 10 TABLET | Refills: 0 | Status: SHIPPED | OUTPATIENT
Start: 2020-05-04 | End: 2021-05-06

## 2020-05-04 RX ORDER — CEFAZOLIN SODIUM 2 G/100ML
2 INJECTION, SOLUTION INTRAVENOUS
Status: COMPLETED | OUTPATIENT
Start: 2020-05-04 | End: 2020-05-04

## 2020-05-04 RX ORDER — NALOXONE HYDROCHLORIDE 0.4 MG/ML
.1-.4 INJECTION, SOLUTION INTRAMUSCULAR; INTRAVENOUS; SUBCUTANEOUS
Status: DISCONTINUED | OUTPATIENT
Start: 2020-05-04 | End: 2020-05-04 | Stop reason: HOSPADM

## 2020-05-04 RX ORDER — IOPAMIDOL 612 MG/ML
INJECTION, SOLUTION INTRAVASCULAR PRN
Status: DISCONTINUED | OUTPATIENT
Start: 2020-05-04 | End: 2020-05-04 | Stop reason: HOSPADM

## 2020-05-04 RX ORDER — CEFAZOLIN SODIUM 1 G/3ML
1 INJECTION, POWDER, FOR SOLUTION INTRAMUSCULAR; INTRAVENOUS SEE ADMIN INSTRUCTIONS
Status: DISCONTINUED | OUTPATIENT
Start: 2020-05-04 | End: 2020-05-04 | Stop reason: HOSPADM

## 2020-05-04 RX ORDER — FENTANYL CITRATE 50 UG/ML
INJECTION, SOLUTION INTRAMUSCULAR; INTRAVENOUS PRN
Status: DISCONTINUED | OUTPATIENT
Start: 2020-05-04 | End: 2020-05-04

## 2020-05-04 RX ORDER — ONDANSETRON 2 MG/ML
4 INJECTION INTRAMUSCULAR; INTRAVENOUS EVERY 30 MIN PRN
Status: DISCONTINUED | OUTPATIENT
Start: 2020-05-04 | End: 2020-05-04 | Stop reason: HOSPADM

## 2020-05-04 RX ORDER — HYDROCODONE BITARTRATE AND ACETAMINOPHEN 5; 325 MG/1; MG/1
1 TABLET ORAL ONCE
Status: CANCELLED | OUTPATIENT
Start: 2020-05-04 | End: 2020-05-04

## 2020-05-04 RX ORDER — DEXAMETHASONE SODIUM PHOSPHATE 4 MG/ML
INJECTION, SOLUTION INTRA-ARTICULAR; INTRALESIONAL; INTRAMUSCULAR; INTRAVENOUS; SOFT TISSUE PRN
Status: DISCONTINUED | OUTPATIENT
Start: 2020-05-04 | End: 2020-05-04

## 2020-05-04 RX ORDER — ACETAMINOPHEN 325 MG/1
650 TABLET ORAL ONCE
Status: CANCELLED | OUTPATIENT
Start: 2020-05-04 | End: 2020-05-04

## 2020-05-04 RX ORDER — SODIUM CHLORIDE, SODIUM LACTATE, POTASSIUM CHLORIDE, CALCIUM CHLORIDE 600; 310; 30; 20 MG/100ML; MG/100ML; MG/100ML; MG/100ML
INJECTION, SOLUTION INTRAVENOUS CONTINUOUS PRN
Status: DISCONTINUED | OUTPATIENT
Start: 2020-05-04 | End: 2020-05-04

## 2020-05-04 RX ORDER — ONDANSETRON 4 MG/1
4 TABLET, ORALLY DISINTEGRATING ORAL EVERY 30 MIN PRN
Status: DISCONTINUED | OUTPATIENT
Start: 2020-05-04 | End: 2020-05-04 | Stop reason: HOSPADM

## 2020-05-04 RX ORDER — MEPERIDINE HYDROCHLORIDE 25 MG/ML
12.5 INJECTION INTRAMUSCULAR; INTRAVENOUS; SUBCUTANEOUS
Status: DISCONTINUED | OUTPATIENT
Start: 2020-05-04 | End: 2020-05-04 | Stop reason: HOSPADM

## 2020-05-04 RX ADMIN — FENTANYL CITRATE 25 MCG: 50 INJECTION, SOLUTION INTRAMUSCULAR; INTRAVENOUS at 08:45

## 2020-05-04 RX ADMIN — FENTANYL CITRATE 25 MCG: 50 INJECTION, SOLUTION INTRAMUSCULAR; INTRAVENOUS at 07:58

## 2020-05-04 RX ADMIN — DEXAMETHASONE SODIUM PHOSPHATE 4 MG: 4 INJECTION, SOLUTION INTRA-ARTICULAR; INTRALESIONAL; INTRAMUSCULAR; INTRAVENOUS; SOFT TISSUE at 07:40

## 2020-05-04 RX ADMIN — PHENYLEPHRINE HYDROCHLORIDE 100 MCG: 10 INJECTION INTRAVENOUS at 07:35

## 2020-05-04 RX ADMIN — ONDANSETRON 4 MG: 2 INJECTION INTRAMUSCULAR; INTRAVENOUS at 07:40

## 2020-05-04 RX ADMIN — PROPOFOL 200 MG: 10 INJECTION, EMULSION INTRAVENOUS at 07:32

## 2020-05-04 RX ADMIN — LIDOCAINE HYDROCHLORIDE 100 MG: 20 INJECTION, SOLUTION INFILTRATION; PERINEURAL at 07:32

## 2020-05-04 RX ADMIN — SODIUM CHLORIDE, POTASSIUM CHLORIDE, SODIUM LACTATE AND CALCIUM CHLORIDE: 600; 310; 30; 20 INJECTION, SOLUTION INTRAVENOUS at 07:28

## 2020-05-04 RX ADMIN — MIDAZOLAM 2 MG: 1 INJECTION INTRAMUSCULAR; INTRAVENOUS at 07:28

## 2020-05-04 RX ADMIN — OXYCODONE HYDROCHLORIDE 5 MG: 5 TABLET ORAL at 10:03

## 2020-05-04 RX ADMIN — FENTANYL CITRATE 50 MCG: 50 INJECTION, SOLUTION INTRAMUSCULAR; INTRAVENOUS at 07:32

## 2020-05-04 RX ADMIN — CEFAZOLIN SODIUM 2 G: 2 INJECTION, SOLUTION INTRAVENOUS at 07:40

## 2020-05-04 RX ADMIN — PHENYLEPHRINE HYDROCHLORIDE 50 MCG: 10 INJECTION INTRAVENOUS at 08:04

## 2020-05-04 ASSESSMENT — MIFFLIN-ST. JEOR: SCORE: 1315.73

## 2020-05-04 NOTE — ANESTHESIA CARE TRANSFER NOTE
Patient: Elle Luu    Procedure(s):  CYSTOSCOPY, LEFT RETROGRADES; ATTEMPTED LEFT STENT PLACEMENT, ATTEMPTED LEFT URETERAL DILATION    Diagnosis: Stricture of ureter [N13.5]  Diagnosis Additional Information: No value filed.    Anesthesia Type:   General     Note:  Airway :Nasal Cannula  Patient transferred to:PACU  Comments: At end of procedure, spontaneous respirations, adequate tidal volumes, followed commands to voice, LMA removed atraumatically, oropharynx suctioned, airway patent after LMA removal. Oxygen via nasal cannula at 2 liters per minute to PACU. Oxygen tubing connected to wall O2 in PACU, SpO2, NiBP, and EKG monitors and alarms on and functioning, Esther Hugger warmer connected to patient gown, report on patient's clinical status given to PACU RN, RN questions answered.  Handoff Report: Identifed the Patient, Identified the Reponsible Provider, Reviewed the pertinent medical history, Discussed the surgical course, Reviewed Intra-OP anesthesia mangement and issues during anesthesia, Set expectations for post-procedure period and Allowed opportunity for questions and acknowledgement of understanding      Vitals: (Last set prior to Anesthesia Care Transfer)    CRNA VITALS  5/4/2020 0845 - 5/4/2020 0915      5/4/2020             Pulse:  68    SpO2:  96 %    Resp Rate (set):  10                Electronically Signed By: LILLIAM De Dios CRNA  May 4, 2020  9:15 AM

## 2020-05-04 NOTE — ANESTHESIA PREPROCEDURE EVALUATION
Anesthesia Pre-Procedure Evaluation    Patient: Elle Luu   MRN: [id number] : 1965          Preoperative Diagnosis: Stricture of ureter [N13.5]    Procedure(s):  CYSTOSCOPY, LEFT RETROGRADES; LEFT STENT PLACEMENT    Past Medical History:   Diagnosis Date     Hypertension      Hypothyroidism      Past Surgical History:   Procedure Laterality Date     APPENDECTOMY       COLONOSCOPY       FOOT SURGERY       GYN SURGERY      D AND C, LEEP PROCEDURE       Anesthesia Evaluation     .             ROS/MED HX    ENT/Pulmonary:  - neg pulmonary ROS     Neurologic:  - neg neurologic ROS     Cardiovascular:     (+) hypertension----. : . . . :. .       METS/Exercise Tolerance:     Hematologic:  - neg hematologic  ROS       Musculoskeletal:  - neg musculoskeletal ROS       GI/Hepatic:        (-) GERD   Renal/Genitourinary:     (+) Nephrolithiasis ,       Endo:     (+) thyroid problem hypothyroidism, .      Psychiatric:  - neg psychiatric ROS       Infectious Disease:  - neg infectious disease ROS       Malignancy:         Other:                          Physical Exam  Normal systems: cardiovascular, pulmonary and dental    Airway   Mallampati: II  TM distance: >3 FB  Neck ROM: full    Dental     Cardiovascular       Pulmonary             Lab Results   Component Value Date    WBC 12.2 (H) 2020    HGB 14.4 2020    HCT 40.8 2020     2020     2020    POTASSIUM 3.8 2020    CHLORIDE 108 2020    CO2 23 2020    BUN 11 2020    CR 0.89 2020     (H) 2020    SANGITA 9.0 2020    ALBUMIN 4.1 2020    PROTTOTAL 8.1 2020    ALT 32 2020    AST 16 2020    ALKPHOS 67 2020    BILITOTAL 0.4 2020    LIPASE 148 2020    AMYLASE 47 2020    TSH 1.56 2013       Preop Vitals  BP Readings from Last 3 Encounters:   20 (!) 145/87   20 (!) 159/72   20 (!) 151/88    Pulse  "Readings from Last 3 Encounters:   05/04/20 79   04/23/20 80   02/03/20 56      Resp Readings from Last 3 Encounters:   05/04/20 16   04/23/20 16   02/03/20 11    SpO2 Readings from Last 3 Encounters:   05/04/20 97%   04/23/20 97%   02/03/20 100%      Temp Readings from Last 1 Encounters:   05/04/20 36.2  C (97.1  F) (Oral)    Ht Readings from Last 1 Encounters:   05/04/20 1.575 m (5' 2\")      Wt Readings from Last 1 Encounters:   05/04/20 77.2 kg (170 lb 4.8 oz)    Estimated body mass index is 31.15 kg/m  as calculated from the following:    Height as of this encounter: 1.575 m (5' 2\").    Weight as of this encounter: 77.2 kg (170 lb 4.8 oz).       Anesthesia Plan      History & Physical Review  History and physical reviewed and following examination; no interval change.    ASA Status:  2 .    NPO Status:  > 8 hours    Plan for General (LMA) with Intravenous induction. Maintenance will be Balanced.    PONV prophylaxis:  Ondansetron (or other 5HT-3) and Dexamethasone or Solumedrol         Postoperative Care  Postoperative pain management:  IV analgesics.      Consents  Anesthetic plan, risks, benefits and alternatives discussed with:  Patient..                 Edy Hinds, DO, DO  "

## 2020-05-04 NOTE — OR NURSING
Discharge instructions reviewed via phone with daughter IVY and also patient. No further questions. Printed form sent home with patient. Patient urinated before going home, urine was pink in color.

## 2020-05-04 NOTE — ANESTHESIA POSTPROCEDURE EVALUATION
Patient: Elle Luu    Procedure(s):  CYSTOSCOPY, LEFT RETROGRADES; ATTEMPTED LEFT STENT PLACEMENT, ATTEMPTED LEFT URETERAL DILATION    Diagnosis:Stricture of ureter [N13.5]  Diagnosis Additional Information: No value filed.    Anesthesia Type:  General    Note:  Anesthesia Post Evaluation    Patient location during evaluation: bedside  Patient participation: Able to fully participate in evaluation  Level of consciousness: awake  Pain management: adequate  Airway patency: patent  Cardiovascular status: acceptable  Respiratory status: acceptable  Hydration status: acceptable  PONV: none     Anesthetic complications: None    Comments: No anesthetic complications noted.         Last vitals:  Vitals:    05/04/20 0945 05/04/20 1000 05/04/20 1015   BP: (!) 143/81 136/72 137/76   Pulse: 60 54 62   Resp: 12 12 30   Temp: 35.7  C (96.3  F) 35.8  C (96.4  F) 35.7  C (96.3  F)   SpO2: 100% 100%          Electronically Signed By: Edy Hinds DO, DO  May 4, 2020  1:52 PM

## 2020-05-04 NOTE — DISCHARGE INSTRUCTIONS
Same Day Surgery Discharge Instructions for  Sedation and General Anesthesia       It's not unusual to feel dizzy, light-headed or faint for up to 24 hours after surgery or while taking pain medication.  If you have these symptoms: sit for a few minutes before standing and have someone assist you when you get up to walk or use the bathroom.      You should rest and relax for the next 24 hours. We recommend you make arrangements to have an adult stay with you for at least 24 hours after your discharge.  Avoid hazardous and strenuous activity.      DO NOT DRIVE any vehicle or operate mechanical equipment for 24 hours following the end of your surgery.  Even though you may feel normal, your reactions may be affected by the medication you have received.      Do not drink alcoholic beverages for 24 hours following surgery.       Slowly progress to your regular diet as you feel able. It's not unusual to feel nauseated and/or vomit after receiving anesthesia.  If you develop these symptoms, drink clear liquids (apple juice, ginger ale, broth, 7-up, etc. ) until you feel better.  If your nausea and vomiting persists for 24 hours, please notify your surgeon.        All narcotic pain medications, along with inactivity and anesthesia, can cause constipation. Drinking plenty of liquids and increasing fiber intake will help.      For any questions of a medical nature, call your surgeon.      Do not make important decisions for 24 hours.      If you had general anesthesia, you may have a sore throat for a couple of days related to the breathing tube used during surgery.  You may use Cepacol lozenges to help with this discomfort.  If it worsens or if you develop a fever, contact your surgeon.       If you feel your pain is not well managed with the pain medications prescribed by your surgeon, please contact your surgeon's office to let them know so they can address your concerns.       CoVid 19 Information    We want to give you  information regarding Covid. Please consult your primary care provider with any questions you might have.     Patient who have symptoms (cough, fever, or shortness of breath), need to isolate for 7 days from when symptoms started OR 72 hours after fever resolves (without fever reducing medications) AND improvement of respiratory symptoms (whichever is longer).      Isolate yourself at home (in own room/own bathroom if possible)    Do Not allow any visitors    Do Not go to work or school    Do Not go to Scientologist,  centers, shopping, or other public places.    Do Not shake hands.    Avoid close and intimate contact with others (hugging, kissing).    Follow CDC recommendations for household cleaning of frequently touched services.     After the initial 7 days, continue to isolate yourself from household members as much as possible. To continue decrease the risk of community spread and exposure, you and any members of your household should limit activities in public for 14 days after starting home isolation.     You can reference the following CDC link for helpful home isolation/care tips:  https://www.cdc.gov/coronavirus/2019-ncov/downloads/10Things.pdf    Protect Others:    Cover Your Mouth and Nose with a mask, disposable tissue or wash cloth to avoid spreading germs to others.    Wash your hands and face frequently with soap and water    Call Your Primary Doctor If: Breathing difficulty develops or you become worse.    For more information about COVID19 and options for caring for yourself at home, please visit the CDC website at https://www.cdc.gov/coronavirus/2019-ncov/about/steps-when-sick.html  For more options for care at [facility], please visit our website at [email or web address]    Cystoscopy Discharge Instructions      Diet:    Return to the diet that you were on before the procedure, unless you are given specific diet instructions.    It is important to drink 6-8  glasses of fluids per day at home - at least 3-4 glasses should be water.    Activity:    Walk short distances and increase as your strength allows.    You may climb stairs.    Do not do strenuous exercise or heavy lifting until approved by surgeon.    Do not drive while taking narcotic pain medications.    Bathing:    You may take a shower.    Call your physician if these signs/symptoms are present:    Pain that is not relieved by a short rest or ordered pain medications.    Temperature at or above 101.0 F or chills.    Inability or difficulty urinating.  Excessive blood in urine.    Any questions or concerns.

## 2020-05-04 NOTE — OP NOTE
Urology Brief Operative Note    Pre-operative diagnosis: Stricture of ureter [N13.5]   Post-operative diagnosis: Same   Procedure: Procedure(s):  CYSTOSCOPY, LEFT RETROGRADES; ATTEMPTED LEFT STENT PLACEMENT, ATTEMPTED LEFT URETERAL DILATION   Surgeon: Acosta Kelly MD   Assistant(s): None   Anesthesia: General endotracheal anesthesia   Estimated blood loss: None   Total IV fluids: (See anesthesia record)   Blood transfusion: No transfusion was given during surgery   Total urine output: (See anesthesia record)   Drains: None   Specimens: None   Implants: None   Findings: VERY NARROW, DENSE LEFT URETERAL STRICTURE. UNABLE TO PASS A STENT OR URETERAL CATHETER   Complications: None   Condition: Stable   Comments: PATIENT WILL NEED LEFT URETERO-URETEROSTOMY WITH PRIMARY REPAIR. SHE ALSO MAY REQUIRE A LEFT PCN IF DEVELOPS PAIN OR FEVER

## 2020-05-04 NOTE — OP NOTE
Procedure Date: 05/04/2020      PREOPERATIVE DIAGNOSIS:  Left ureteral stricture.      POSTOPERATIVE DIAGNOSIS:  Left ureteral stricture.      PROCEDURES:  Cystoscopy, left retrograde ureteral pyelogram, attempted left stent placement, attempted left ureteral dilation.      SURGEON:  Acosta Kelly MD      ANESTHESIA:  General anesthetic.      COMPLICATIONS:  None.      ESTIMATED BLOOD LOSS:  No blood loss.        FINDINGS:  A very narrow and dense left ureteral stricture.  Unable to pass a stent or ureteral catheter.      BRIEF HISTORY:  Elle Luu is a 55-year-old woman who has a history of renal colic and prior ureteral stone surgery.  She had a very difficult procedure performed by me back in 2018 for an impacted left ureteral calculus.  This was performed elsewhere.  The patient was advised that because of the procedure she should expect to have a followup CT scan in 3-4 months to assess for stricture disease.  However, she was lost to followup and then because of COVID pandemic, was fearful of presenting for medical care.  She was then seen in a video consult on 04/23.        She was seen at the [facility] for flank pain, but there were no films.  She then was advised to follow up, but delayed it and then finally was seen in April.  At that point, she was seen again in the [facility].  She had a noncontrast CT that showed significant left hydronephrosis and pyelocaliectasis with ureterectasis to the level of the prior surgery.  There was no stone seen.  She had had no fever or chills, and a creatinine was 0.89.  Advised her that she most likely has a stricture and requires cystoscopy, retrograde and stent.  The procedure, risks and complications were discussed.      DESCRIPTION OF THE PROCEDURE:  The patient was seen preoperatively.  Consent completed and signed, brought to the OR, given a general anesthetic, prepped and draped in the dorsal lithotomy position.  A  timeout was taken.  The patient, procedure, and OR staff identified.  Cystourethroscopic exam was performed showing a normal urethra and bladder, normal right and left ureteral orifice.  The left ureteral orifice was catheterized with an open-ended ureteral catheter.  Retrograde was performed showing a severe narrowing right at the pelvic inlet measuring approximately 1-1.5 cm.  The proximal ureter was dilated.  I then now advanced a 0.035 Glidewire up through this area and it went up easily.  I now went ahead and decided to use the disposable ureteral dilators, and I passed a 6-Salvadorean ureteral dilator over the wire and passed it up to the narrowing; however, I could not advance it as the distal ureter bowed and I was unable to advance this.  Therefore, I removed the ureteral dilator and went ahead and tried to pass a 5-Salvadorean open-ended ureteral catheter.  This also would not pass.  Now I went ahead and inserted the open-ended ureteral, that was the green one which was more firm, and tried to advance the 6-Salvadorean green ureteral catheter.  This also would not pass.  I then attempted to pass a 4.8 Salvadorean ureteral double-J stent and this would not pass.  I then went ahead and took the ureteral catheter and put it up to the area of narrowing, removed the wire, and shot another retrograde.  There was no extravasation.  The stricture was there.  I went ahead now and passed the wire back up.  I then attempted to pass the balloon for dilation because the tip was a 5 mm tip, and the 5 mm tip would not pass because the balloon could not pass.  Therefore, the procedure was terminated.  I realized that the patient would have no stent, but because I anticipated that she might need a percutaneous nephrostomy at some point, I simply removed the wire and emptied the bladder.      PLAN:  She most likely will need a robotic ureteroureterostomy.  I will arrange for her to see one of our robotic surgeons.  If she develops severe pain  or if she develops fever or chills, she will require a temporary left percutaneous nephrostomy drainage.         RINA MADDOX MD             D: 2020   T: 2020   MT: JHONNY      Name:     BRYANT MOON   MRN:      [id number]        Account:        [id number]   :      1965           Procedure Date: 2020      Document: [id number]

## 2020-05-22 ENCOUNTER — HOSPITAL ENCOUNTER (OUTPATIENT)
Dept: NUCLEAR MEDICINE | Facility: CLINIC | Age: 55
Setting detail: OBSERVATION
Discharge: HOME | End: 2020-05-22
Attending: UROLOGY | Admitting: UROLOGY
Payer: COMMERCIAL

## 2020-05-22 DIAGNOSIS — N13.5 OBSTRUCTION OF LEFT URETER: ICD-10-CM

## 2020-05-22 PROCEDURE — 34300033 ZZH RX 343: Performed by: UROLOGY

## 2020-05-22 PROCEDURE — A9562 TC99M MERTIATIDE: HCPCS | Performed by: UROLOGY

## 2020-05-22 PROCEDURE — 25000128 H RX IP 250 OP 636: Performed by: UROLOGY

## 2020-05-22 PROCEDURE — 78708 K FLOW/FUNCT IMAGE W/DRUG: CPT

## 2020-05-22 RX ORDER — FUROSEMIDE 10 MG/ML
40 INJECTION INTRAMUSCULAR; INTRAVENOUS ONCE
Status: COMPLETED | OUTPATIENT
Start: 2020-05-22 | End: 2020-05-22

## 2020-05-22 RX ADMIN — TECHNESCAN TC 99M MERTIATIDE 8.6 MILLICURIE: 1 INJECTION, POWDER, LYOPHILIZED, FOR SOLUTION INTRAVENOUS at 11:24

## 2020-05-22 RX ADMIN — FUROSEMIDE 40 MG: 10 INJECTION, SOLUTION INTRAVENOUS at 11:27

## 2021-05-06 ENCOUNTER — OFFICE VISIT (OUTPATIENT)
Dept: DERMATOLOGY | Facility: CLINIC | Age: 56
End: 2021-05-06
Payer: COMMERCIAL

## 2021-05-06 VITALS — OXYGEN SATURATION: 98 % | HEART RATE: 62 BPM | DIASTOLIC BLOOD PRESSURE: 87 MMHG | SYSTOLIC BLOOD PRESSURE: 135 MMHG

## 2021-05-06 DIAGNOSIS — L57.0 ACTINIC KERATOSIS: ICD-10-CM

## 2021-05-06 DIAGNOSIS — L82.1 SEBORRHEIC KERATOSIS: ICD-10-CM

## 2021-05-06 DIAGNOSIS — L81.4 LENTIGO: ICD-10-CM

## 2021-05-06 DIAGNOSIS — D18.01 ANGIOMA OF SKIN: ICD-10-CM

## 2021-05-06 DIAGNOSIS — D22.9 NEVUS: ICD-10-CM

## 2021-05-06 DIAGNOSIS — L40.9 PSORIASIS: ICD-10-CM

## 2021-05-06 DIAGNOSIS — D48.5 NEOPLASM OF UNCERTAIN BEHAVIOR OF SKIN: Primary | ICD-10-CM

## 2021-05-06 PROCEDURE — 11102 TANGNTL BX SKIN SINGLE LES: CPT | Performed by: PHYSICIAN ASSISTANT

## 2021-05-06 PROCEDURE — 17000 DESTRUCT PREMALG LESION: CPT | Mod: 59 | Performed by: PHYSICIAN ASSISTANT

## 2021-05-06 PROCEDURE — 88305 TISSUE EXAM BY PATHOLOGIST: CPT | Performed by: DERMATOLOGY

## 2021-05-06 PROCEDURE — 99203 OFFICE O/P NEW LOW 30 MIN: CPT | Mod: 25 | Performed by: PHYSICIAN ASSISTANT

## 2021-05-06 RX ORDER — BETAMETHASONE DIPROPIONATE 0.5 MG/G
CREAM TOPICAL
Qty: 50 G | Refills: 2 | Status: SHIPPED | OUTPATIENT
Start: 2021-05-06

## 2021-05-06 NOTE — PROGRESS NOTES
HPI:   Chief complaints: Elle Luu is a pleasant 56 year old female who presents for Full skin cancer screening to rule out skin cancer   Last Skin Exam: n/a      1st Baseline: yes  Personal HX of Skin Cancer: no   Personal HX of Malignant Melanoma: no   Family HX of Skin Cancer / Malignant Melanoma: no  Personal HX of Atypical Moles:   no  Risk factors: history of sun exposure and burns  New / Changing lesions:yes brown spot on the nose that is getting darker  Social History: works for [employer] as an RN  On review of systems, there are no further skin complaints, patient is feeling otherwise well.   ROS of the following were done and are negative: Constitutional, Eyes, Ears, Nose,   Mouth, Throat, Cardiovascular, Respiratory, GI, Genitourinary, Musculoskeletal,   Psychiatric, Endocrine, Allergic/Immunologic.    PHYSICAL EXAM:   /87 (BP Location: Left arm, Patient Position: Sitting, Cuff Size: Adult Large)   Pulse 62   LMP 08/17/2014   SpO2 98%   Skin exam performed as follows: Type 2 skin. Mood appropriate  Alert and Oriented X 3. Well developed, well nourished in no distress.  General appearance: Normal  Head including face: Normal  Eyes: conjunctiva and lids: Normal  Mouth: Lips, teeth, gums: Normal  Neck: Normal  Chest-breast/axillae: Normal  Back: Normal  Spleen and liver: Normal  Cardiovascular: Exam of peripheral vascular system by observation for swelling, varicosities, edema: Normal  Genitalia: groin, buttocks: Normal  Extremities: digits/nails (clubbing): Normal  Eccrine and Apocrine glands: Normal  Right upper extremity: Normal  Left upper extremity: Normal  Right lower extremity: Normal  Left lower extremity: Normal  Skin: Scalp and body hair: See below    Pt deferred exam of breasts, groin, buttocks: No    Other physical findings:  1. Multiple pigmented macules on extremities and trunk  2. Multiple pigmented macules on face, trunk and extremities  3. Multiple vascular  papules on trunk, arms and legs  4. Multiple scattered keratotic plaques  5. Pink gritty papule on the mid nasal bridge x 1  6. Irregular dark brown macule 8 mm on the mid nasal bridge  7. Psoriasiform dermatitis on the left palm, bilateral plantar surfaces, ears       Except as noted above, no other signs of skin cancer or melanoma.     ASSESSMENT/PLAN:   Benign Full skin cancer screening today. . Patient with history of none  Advised on monthly self exams and 1 year  Patient Education: Appropriate brochures given.    1. Multiple benign appearing nevi on arms, legs and trunk. Discussed ABCDEs of melanoma and sunscreen.   2. Multiple lentigos on arms, legs and trunk. Advised benign, no treatment needed.  3. Multiple scattered angiomas. Advised benign, no treatment needed.   4. Seborrheic keratosis on arms, legs and trunk. Advised benign, no treatment needed.  5. Actinic keratosis on the mid nasal bridge x 1. As precancerous, cryosurgery performed. Advised on blistering and post-op care. Advised if not resolved in 1-2 months to return for evaluation  6. Lentigo r/o lentigo maligna on the nasal bridge. Shave biopsy performed.  Area cleaned and anesthetized with 1% lidocaine with epinephrine.  Dermablade used to remove the lesion and sent to pathology. Bleeding was cauterized. Pt tolerated procedure well with no complications.   7. Psoriasis on the ears, left palm, bilateral feet - start betamethasone cream BID x 1-2 weeks PRN              Follow-up: yearly FSE/PRN sooner/pending path    1.) Patient was asked about new and changing moles. YES  2.) Patient received a complete physical skin examination: YES  3.) Patient was counseled to perform a monthly self skin examination: YES  Scribed By: Colette Loredo, MS, PA-C

## 2021-05-06 NOTE — NURSING NOTE
Chief Complaint   Patient presents with     Skin Check       Vitals:    05/06/21 1232   BP: 135/87   BP Location: Left arm   Patient Position: Sitting   Cuff Size: Adult Large   Pulse: 62   SpO2: 98%     Wt Readings from Last 1 Encounters:   05/04/20 77.2 kg (170 lb 4.8 oz)       Yuliana Menezes LPN .................5/6/2021

## 2021-05-06 NOTE — LETTER
5/6/2021         RE: Elle Luu  [address] [facility] [address]        Dear Colleague,    Thank you for referring your patient, Elle Luu, to the [facility]. Please see a copy of my visit note below.    HPI:   Chief complaints: Elle Luu is a pleasant 56 year old female who presents for Full skin cancer screening to rule out skin cancer   Last Skin Exam: n/a      1st Baseline: yes  Personal HX of Skin Cancer: no   Personal HX of Malignant Melanoma: no   Family HX of Skin Cancer / Malignant Melanoma: no  Personal HX of Atypical Moles:   no  Risk factors: history of sun exposure and burns  New / Changing lesions:yes brown spot on the nose that is getting darker  Social History: works for [email or web address] as an RN  On review of systems, there are no further skin complaints, patient is feeling otherwise well.   ROS of the following were done and are negative: Constitutional, Eyes, Ears, Nose,   Mouth, Throat, Cardiovascular, Respiratory, GI, Genitourinary, Musculoskeletal,   Psychiatric, Endocrine, Allergic/Immunologic.    PHYSICAL EXAM:   /87 (BP Location: Left arm, Patient Position: Sitting, Cuff Size: Adult Large)   Pulse 62   LMP 08/17/2014   SpO2 98%   Skin exam performed as follows: Type 2 skin. Mood appropriate  Alert and Oriented X 3. Well developed, well nourished in no distress.  General appearance: Normal  Head including face: Normal  Eyes: conjunctiva and lids: Normal  Mouth: Lips, teeth, gums: Normal  Neck: Normal  Chest-breast/axillae: Normal  Back: Normal  Spleen and liver: Normal  Cardiovascular: Exam of peripheral vascular system by observation for swelling, varicosities, edema: Normal  Genitalia: groin, buttocks: Normal  Extremities: digits/nails (clubbing): Normal  Eccrine and Apocrine glands: Normal  Right upper extremity: Normal  Left upper extremity: Normal  Right lower extremity: Normal  Left lower extremity:  Normal  Skin: Scalp and body hair: See below    Pt deferred exam of breasts, groin, buttocks: No    Other physical findings:  1. Multiple pigmented macules on extremities and trunk  2. Multiple pigmented macules on face, trunk and extremities  3. Multiple vascular papules on trunk, arms and legs  4. Multiple scattered keratotic plaques  5. Pink gritty papule on the mid nasal bridge x 1  6. Irregular dark brown macule 8 mm on the mid nasal bridge  7. Psoriasiform dermatitis on the left palm, bilateral plantar surfaces, ears       Except as noted above, no other signs of skin cancer or melanoma.     ASSESSMENT/PLAN:   Benign Full skin cancer screening today. . Patient with history of none  Advised on monthly self exams and 1 year  Patient Education: Appropriate brochures given.    1. Multiple benign appearing nevi on arms, legs and trunk. Discussed ABCDEs of melanoma and sunscreen.   2. Multiple lentigos on arms, legs and trunk. Advised benign, no treatment needed.  3. Multiple scattered angiomas. Advised benign, no treatment needed.   4. Seborrheic keratosis on arms, legs and trunk. Advised benign, no treatment needed.  5. Actinic keratosis on the mid nasal bridge x 1. As precancerous, cryosurgery performed. Advised on blistering and post-op care. Advised if not resolved in 1-2 months to return for evaluation  6. Lentigo r/o lentigo maligna on the nasal bridge. Shave biopsy performed.  Area cleaned and anesthetized with 1% lidocaine with epinephrine.  Dermablade used to remove the lesion and sent to pathology. Bleeding was cauterized. Pt tolerated procedure well with no complications.   7. Psoriasis on the ears, left palm, bilateral feet - start betamethasone cream BID x 1-2 weeks PRN              Follow-up: yearly FSE/PRN sooner/pending path    1.) Patient was asked about new and changing moles. YES  2.) Patient received a complete physical skin examination: YES  3.) Patient was counseled to perform a monthly self  skin examination: YES  Scribed By: Colette Loredo, MS, PABRODY          Again, thank you for allowing me to participate in the care of your patient.        Sincerely,        Colette Loredo PA-C

## 2021-05-06 NOTE — PATIENT INSTRUCTIONS
Wound Care Instructions     FOR SUPERFICIAL WOUNDS     [facility] [phone]    [facility] [phone]                       AFTER 24 HOURS YOU SHOULD REMOVE THE BANDAGE AND BEGIN DAILY DRESSING CHANGES AS FOLLOWS:     1) Remove Dressing.     2) Clean and dry the area with tap water using a Q-tip or sterile gauze pad.     3) Apply Vaseline, Aquaphor, Polysporin ointment or Bacitracin ointment over entire wound.  Do NOT use Neosporin ointment.     4) Cover the wound with a band-aid, or a sterile non-stick gauze pad and micropore paper tape      REPEAT THESE INSTRUCTIONS AT LEAST ONCE A DAY UNTIL THE WOUND HAS COMPLETELY HEALED.    It is an old wives tale that a wound heals better when it is exposed to air and allowed to dry out. The wound will heal faster with a better cosmetic result if it is kept moist with ointment and covered with a bandage.    **Do not let the wound dry out.**      Supplies Needed:      *Cotton tipped applicators (Q-tips)    *Polysporin Ointment or Bacitracin Ointment (NOT NEOSPORIN)    *Band-aids or non-stick gauze pads and micropore paper tape.      PATIENT INFORMATION:    During the healing process you will notice a number of changes. All wounds develop a small halo of redness surrounding the wound.  This means healing is occurring. Severe itching with extensive redness usually indicates sensitivity to the ointment or bandage tape used to dress the wound.  You should call our office if this develops.      Swelling  and/or discoloration around your surgical site is common, particularly when performed around the eye.    All wounds normally drain.  The larger the wound the more drainage there will be.  After 7-10 days, you will notice the wound beginning to shrink and new skin will begin to grow.  The wound is healed when you can see skin has formed over the entire area.  A healed wound has a healthy, shiny look to the surface and is red to dark pink in color  to normalize.  Wounds may take approximately 4-6 weeks to heal.  Larger wounds may take 6-8 weeks.  After the wound is healed you may discontinue dressing changes.    You may experience a sensation of tightness as your wound heals. This is normal and will gradually subside.    Your healed wound may be sensitive to temperature changes. This sensitivity improves with time, but if you re having a lot of discomfort, try to avoid temperature extremes.    Patients frequently experience itching after their wound appears to have healed because of the continue healing under the skin.  Plain Vaseline will help relieve the itching.        POSSIBLE COMPLICATIONS    BLEEDIN. Leave the bandage in place.  2. Use tightly rolled up gauze or a cloth to apply direct pressure over the bandage for 30  minutes.  3. Reapply pressure for an additional 30 minutes if necessary  4. Use additional gauze and tape to maintain pressure once the bleeding has stopped.

## 2021-05-07 LAB — COPATH REPORT: NORMAL

## 2021-05-08 ENCOUNTER — TELEPHONE (OUTPATIENT)
Dept: NURSING | Facility: CLINIC | Age: 56
End: 2021-05-08

## 2021-05-08 NOTE — TELEPHONE ENCOUNTER
Patient calling requesting results from her dermatology appointment yesterday. Reviewed lab note from MD. Patient also inquiring into how to get a [employer] vaccine after receiving the Pfizer. Advised to go to the [facility] website for assistance.     Yuliana Alcocer RN 05/08/21 8:45 AM   [facility] Triage Nurse Advisor

## 2021-09-11 ENCOUNTER — HEALTH MAINTENANCE LETTER (OUTPATIENT)
Age: 56
End: 2021-09-11

## 2022-10-30 ENCOUNTER — HEALTH MAINTENANCE LETTER (OUTPATIENT)
Age: 57
End: 2022-10-30

## 2022-12-28 ENCOUNTER — HOSPITAL ENCOUNTER (EMERGENCY)
Facility: CLINIC | Age: 57
Discharge: HOME | End: 2022-12-28
Payer: COMMERCIAL

## 2022-12-28 VITALS
HEART RATE: 69 BPM | SYSTOLIC BLOOD PRESSURE: 148 MMHG | DIASTOLIC BLOOD PRESSURE: 63 MMHG | TEMPERATURE: 96.7 F | OXYGEN SATURATION: 98 % | RESPIRATION RATE: 16 BRPM

## 2022-12-29 NOTE — ED TRIAGE NOTES
Pt presents with left sided flank pain she states has been present since 12/19 when she had a renal stent placed.  Pt states stent was removed today, pt prescribed and taking norco, but feels her pain is not adequately being cotrolled with this.  Pt denies fevers, chills, or concerns of complications from stent removal.  Pt states she just want medication to control pain.       Triage Assessment     Row Name 12/28/22 2058       Triage Assessment (Adult)    Airway WDL WDL       Respiratory WDL    Respiratory WDL WDL       Skin Circulation/Temperature WDL    Skin Circulation/Temperature WDL WDL       Cardiac WDL    Cardiac WDL WDL       Peripheral/Neurovascular WDL    Peripheral Neurovascular WDL WDL       Cognitive/Neuro/Behavioral WDL    Cognitive/Neuro/Behavioral WDL WDL              
Verbalized Understanding/Simple: Patient demonstrates quick and easy understanding

## 2023-11-12 ENCOUNTER — HEALTH MAINTENANCE LETTER (OUTPATIENT)
Age: 58
End: 2023-11-12

## 2024-12-28 ENCOUNTER — HEALTH MAINTENANCE LETTER (OUTPATIENT)
Age: 59
End: 2024-12-28

## 2025-04-28 ENCOUNTER — APPOINTMENT (OUTPATIENT)
Dept: CT IMAGING | Facility: CLINIC | Age: 60
End: 2025-04-28
Attending: EMERGENCY MEDICINE
Payer: COMMERCIAL

## 2025-04-28 ENCOUNTER — HOSPITAL ENCOUNTER (EMERGENCY)
Facility: CLINIC | Age: 60
Discharge: HOME | End: 2025-04-28
Attending: EMERGENCY MEDICINE | Admitting: EMERGENCY MEDICINE
Payer: COMMERCIAL

## 2025-04-28 VITALS
RESPIRATION RATE: 20 BRPM | SYSTOLIC BLOOD PRESSURE: 135 MMHG | OXYGEN SATURATION: 94 % | DIASTOLIC BLOOD PRESSURE: 82 MMHG | TEMPERATURE: 97.6 F | HEART RATE: 75 BPM

## 2025-04-28 DIAGNOSIS — R31.9 HEMATURIA, UNSPECIFIED TYPE: ICD-10-CM

## 2025-04-28 DIAGNOSIS — N20.1 URETERAL CALCULUS, LEFT: ICD-10-CM

## 2025-04-28 DIAGNOSIS — R16.0 HEPATOMEGALY: ICD-10-CM

## 2025-04-28 DIAGNOSIS — Z98.890 HISTORY OF REMOVAL OF URETERAL STENT: ICD-10-CM

## 2025-04-28 DIAGNOSIS — N13.30 HYDRONEPHROSIS OF LEFT KIDNEY: ICD-10-CM

## 2025-04-28 LAB
ALBUMIN UR-MCNC: ABNORMAL G/DL
ANION GAP SERPL CALCULATED.3IONS-SCNC: 14 MMOL/L (ref 7–15)
APPEARANCE UR: CLEAR
BACTERIA #/AREA URNS HPF: ABNORMAL /HPF
BASOPHILS # BLD AUTO: 0.1 10E3/UL (ref 0–0.2)
BASOPHILS NFR BLD AUTO: 1 %
BILIRUB UR QL STRIP: ABNORMAL
BUN SERPL-MCNC: 13.7 MG/DL (ref 8–23)
CALCIUM SERPL-MCNC: 9.1 MG/DL (ref 8.8–10.4)
CHLORIDE SERPL-SCNC: 99 MMOL/L (ref 98–107)
COLOR UR AUTO: ABNORMAL
CREAT BLD-MCNC: 1 MG/DL (ref 0.5–1)
CREAT SERPL-MCNC: 0.86 MG/DL (ref 0.51–0.95)
EGFRCR SERPLBLD CKD-EPI 2021: 76 ML/MIN/1.73M2
EGFRCR SERPLBLD CKD-EPI 2021: >60 ML/MIN/1.73M2
EOSINOPHIL # BLD AUTO: 0.1 10E3/UL (ref 0–0.7)
EOSINOPHIL NFR BLD AUTO: 0 %
ERYTHROCYTE [DISTWIDTH] IN BLOOD BY AUTOMATED COUNT: 12.5 % (ref 10–15)
GLUCOSE SERPL-MCNC: 159 MG/DL (ref 70–99)
GLUCOSE UR STRIP-MCNC: ABNORMAL MG/DL
HCO3 SERPL-SCNC: 23 MMOL/L (ref 22–29)
HCT VFR BLD AUTO: 41.6 % (ref 35–47)
HGB BLD-MCNC: 14.3 G/DL (ref 11.7–15.7)
HGB UR QL STRIP: ABNORMAL
IMM GRANULOCYTES # BLD: 0.1 10E3/UL
IMM GRANULOCYTES NFR BLD: 0 %
KETONES UR STRIP-MCNC: ABNORMAL MG/DL
LEUKOCYTE ESTERASE UR QL STRIP: ABNORMAL
LYMPHOCYTES # BLD AUTO: 1.4 10E3/UL (ref 0.8–5.3)
LYMPHOCYTES NFR BLD AUTO: 8 %
MCH RBC QN AUTO: 33.3 PG (ref 26.5–33)
MCHC RBC AUTO-ENTMCNC: 34.4 G/DL (ref 31.5–36.5)
MCV RBC AUTO: 97 FL (ref 78–100)
MONOCYTES # BLD AUTO: 0.5 10E3/UL (ref 0–1.3)
MONOCYTES NFR BLD AUTO: 3 %
MUCOUS THREADS #/AREA URNS LPF: PRESENT /LPF
NEUTROPHILS # BLD AUTO: 14.4 10E3/UL (ref 1.6–8.3)
NEUTROPHILS NFR BLD AUTO: 87 %
NITRATE UR QL: ABNORMAL
NRBC # BLD AUTO: 0 10E3/UL
NRBC BLD AUTO-RTO: 0 /100
PH UR STRIP: ABNORMAL [PH]
PLATELET # BLD AUTO: 363 10E3/UL (ref 150–450)
POTASSIUM SERPL-SCNC: 4.2 MMOL/L (ref 3.4–5.3)
RBC # BLD AUTO: 4.29 10E6/UL (ref 3.8–5.2)
RBC URINE: 149 /HPF
SODIUM SERPL-SCNC: 136 MMOL/L (ref 135–145)
SP GR UR STRIP: 1.02 (ref 1–1.03)
SQUAMOUS EPITHELIAL: <1 /HPF
UROBILINOGEN UR STRIP-MCNC: ABNORMAL MG/DL
WBC # BLD AUTO: 16.5 10E3/UL (ref 4–11)
WBC URINE: 8 /HPF

## 2025-04-28 PROCEDURE — 36415 COLL VENOUS BLD VENIPUNCTURE: CPT | Performed by: EMERGENCY MEDICINE

## 2025-04-28 PROCEDURE — 250N000011 HC RX IP 250 OP 636: Mod: JZ | Performed by: EMERGENCY MEDICINE

## 2025-04-28 PROCEDURE — 80048 BASIC METABOLIC PNL TOTAL CA: CPT | Performed by: EMERGENCY MEDICINE

## 2025-04-28 PROCEDURE — 99285 EMERGENCY DEPT VISIT HI MDM: CPT | Mod: 25

## 2025-04-28 PROCEDURE — 96375 TX/PRO/DX INJ NEW DRUG ADDON: CPT

## 2025-04-28 PROCEDURE — 74177 CT ABD & PELVIS W/CONTRAST: CPT

## 2025-04-28 PROCEDURE — 96361 HYDRATE IV INFUSION ADD-ON: CPT

## 2025-04-28 PROCEDURE — 250N000011 HC RX IP 250 OP 636: Performed by: EMERGENCY MEDICINE

## 2025-04-28 PROCEDURE — 258N000003 HC RX IP 258 OP 636: Performed by: EMERGENCY MEDICINE

## 2025-04-28 PROCEDURE — 82565 ASSAY OF CREATININE: CPT

## 2025-04-28 PROCEDURE — 87086 URINE CULTURE/COLONY COUNT: CPT | Performed by: EMERGENCY MEDICINE

## 2025-04-28 PROCEDURE — 85004 AUTOMATED DIFF WBC COUNT: CPT | Performed by: EMERGENCY MEDICINE

## 2025-04-28 PROCEDURE — 96374 THER/PROPH/DIAG INJ IV PUSH: CPT | Mod: 59

## 2025-04-28 PROCEDURE — 81001 URINALYSIS AUTO W/SCOPE: CPT | Performed by: EMERGENCY MEDICINE

## 2025-04-28 PROCEDURE — 250N000009 HC RX 250: Performed by: EMERGENCY MEDICINE

## 2025-04-28 RX ORDER — KETOROLAC TROMETHAMINE 15 MG/ML
15 INJECTION, SOLUTION INTRAMUSCULAR; INTRAVENOUS ONCE
Status: COMPLETED | OUTPATIENT
Start: 2025-04-28 | End: 2025-04-28

## 2025-04-28 RX ORDER — IOPAMIDOL 755 MG/ML
85 INJECTION, SOLUTION INTRAVASCULAR ONCE
Status: COMPLETED | OUTPATIENT
Start: 2025-04-28 | End: 2025-04-28

## 2025-04-28 RX ORDER — KETOROLAC TROMETHAMINE 10 MG/1
10 TABLET, FILM COATED ORAL EVERY 6 HOURS PRN
Qty: 10 TABLET | Refills: 0 | Status: SHIPPED | OUTPATIENT
Start: 2025-04-28

## 2025-04-28 RX ORDER — CEFDINIR 300 MG/1
300 CAPSULE ORAL 2 TIMES DAILY
Qty: 20 CAPSULE | Refills: 0 | Status: SHIPPED | OUTPATIENT
Start: 2025-04-28 | End: 2025-05-08

## 2025-04-28 RX ORDER — ONDANSETRON 2 MG/ML
4 INJECTION INTRAMUSCULAR; INTRAVENOUS ONCE
Status: COMPLETED | OUTPATIENT
Start: 2025-04-28 | End: 2025-04-28

## 2025-04-28 RX ADMIN — IOPAMIDOL 85 ML: 755 INJECTION, SOLUTION INTRAVENOUS at 20:11

## 2025-04-28 RX ADMIN — ONDANSETRON 4 MG: 2 INJECTION, SOLUTION INTRAMUSCULAR; INTRAVENOUS at 19:55

## 2025-04-28 RX ADMIN — SODIUM CHLORIDE 64 ML: 9 INJECTION, SOLUTION INTRAVENOUS at 20:11

## 2025-04-28 RX ADMIN — SODIUM CHLORIDE 1000 ML: 0.9 INJECTION, SOLUTION INTRAVENOUS at 19:55

## 2025-04-28 RX ADMIN — KETOROLAC TROMETHAMINE 15 MG: 15 INJECTION, SOLUTION INTRAMUSCULAR; INTRAVENOUS at 19:55

## 2025-04-28 ASSESSMENT — ACTIVITIES OF DAILY LIVING (ADL)
ADLS_ACUITY_SCORE: 41

## 2025-04-28 ASSESSMENT — COLUMBIA-SUICIDE SEVERITY RATING SCALE - C-SSRS
6. HAVE YOU EVER DONE ANYTHING, STARTED TO DO ANYTHING, OR PREPARED TO DO ANYTHING TO END YOUR LIFE?: NO
1. IN THE PAST MONTH, HAVE YOU WISHED YOU WERE DEAD OR WISHED YOU COULD GO TO SLEEP AND NOT WAKE UP?: NO
2. HAVE YOU ACTUALLY HAD ANY THOUGHTS OF KILLING YOURSELF IN THE PAST MONTH?: NO

## 2025-04-28 NOTE — ED TRIAGE NOTES
Pt presents to triage with daughter after having ureter stent removed today; pt had left stent removed at 11a today and pt is having severe left flank pain, even after taking Oxycodone 10mg at home. Pt also took AZO at home, has urinated.      Triage Assessment (Adult)       Row Name 04/28/25 9128          Triage Assessment    Airway WDL WDL        Respiratory WDL    Respiratory WDL WDL        Skin Circulation/Temperature WDL    Skin Circulation/Temperature WDL WDL        Cardiac WDL    Cardiac WDL WDL        Peripheral/Neurovascular WDL    Peripheral Neurovascular WDL WDL        Cognitive/Neuro/Behavioral WDL    Cognitive/Neuro/Behavioral WDL WDL
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
## 2025-04-29 NOTE — DISCHARGE INSTRUCTIONS
Or abdominal cause milligrams of Tylenol every 6 hours, you can use the current milligrams ibuprofen or the 10 mg of ketorolac every 6 hours.  You should drink plenty fluids to stay well-hydrated.  You should take the cefdinir as prescribed.  If you have severe pain, unable to urinate or develop a fever, you should be rechecked in emergency department immediately.  Otherwise, I would follow-up with your urology team and call them first thing in the morning.  I did talk to Dr. Stuart mckenzie, so they should know about you.

## 2025-04-30 LAB — BACTERIA UR CULT: NORMAL

## (undated) DEVICE — INFLATION DEVICE ENCORE  26 PRESSURE GAUGE 20ML M0067101140

## (undated) DEVICE — CATH URETERAL CONE 08FR 70CM 138008LT / 138008RT

## (undated) DEVICE — RAD RX ISOVUE 300 (50ML) 61% IOPAMIDOL CHARGE PER ML

## (undated) DEVICE — CATH URETERAL WHISTLE 5FR 115CM 136405

## (undated) DEVICE — DILATATION SYSTEM URETERAL 6-16FR M0062401000

## (undated) DEVICE — BAG CYSTO TABLE DRAIN

## (undated) DEVICE — PAD CHUX UNDERPAD 23X24" 7136

## (undated) DEVICE — GUIDEWIRE ZIPWIRE STR STIFF .035"X150CM M006630222B0

## (undated) DEVICE — PACK CYSTOSCOPY SBA15CYFSI

## (undated) DEVICE — SOL WATER IRRIG 1000ML BOTTLE 2F7114

## (undated) DEVICE — CATH URETERAL OPEN END 6FR AXXCESS

## (undated) DEVICE — CATH BALLOON DILATATION UROMAX ULTRA 18FRX4CM M0062251020

## (undated) DEVICE — GLOVE PROTEXIS W/NEU-THERA 7.5  2D73TE75

## (undated) DEVICE — Device

## (undated) DEVICE — SOL WATER IRRIG 3000ML BAG 2B7117

## (undated) RX ORDER — ONDANSETRON 2 MG/ML
INJECTION INTRAMUSCULAR; INTRAVENOUS
Status: DISPENSED
Start: 2020-05-04

## (undated) RX ORDER — FUROSEMIDE 10 MG/ML
INJECTION INTRAMUSCULAR; INTRAVENOUS
Status: DISPENSED
Start: 2020-05-22

## (undated) RX ORDER — OXYCODONE HYDROCHLORIDE 5 MG/1
TABLET ORAL
Status: DISPENSED
Start: 2020-05-04

## (undated) RX ORDER — LIDOCAINE HYDROCHLORIDE 20 MG/ML
INJECTION, SOLUTION EPIDURAL; INFILTRATION; INTRACAUDAL; PERINEURAL
Status: DISPENSED
Start: 2020-05-04

## (undated) RX ORDER — FENTANYL CITRATE 50 UG/ML
INJECTION, SOLUTION INTRAMUSCULAR; INTRAVENOUS
Status: DISPENSED
Start: 2020-05-04

## (undated) RX ORDER — DEXAMETHASONE SODIUM PHOSPHATE 4 MG/ML
INJECTION, SOLUTION INTRA-ARTICULAR; INTRALESIONAL; INTRAMUSCULAR; INTRAVENOUS; SOFT TISSUE
Status: DISPENSED
Start: 2020-05-04